# Patient Record
Sex: MALE | URBAN - NONMETROPOLITAN AREA
[De-identification: names, ages, dates, MRNs, and addresses within clinical notes are randomized per-mention and may not be internally consistent; named-entity substitution may affect disease eponyms.]

---

## 2018-01-23 ENCOUNTER — OFFICE VISIT CONVERTED (OUTPATIENT)
Dept: FAMILY MEDICINE CLINIC | Age: 73
End: 2018-01-23
Attending: FAMILY MEDICINE

## 2018-01-26 ENCOUNTER — OFFICE VISIT CONVERTED (OUTPATIENT)
Dept: FAMILY MEDICINE CLINIC | Age: 73
End: 2018-01-26
Attending: FAMILY MEDICINE

## 2018-04-04 ENCOUNTER — OFFICE VISIT CONVERTED (OUTPATIENT)
Dept: FAMILY MEDICINE CLINIC | Age: 73
End: 2018-04-04
Attending: NURSE PRACTITIONER

## 2018-04-17 ENCOUNTER — OFFICE VISIT CONVERTED (OUTPATIENT)
Dept: FAMILY MEDICINE CLINIC | Age: 73
End: 2018-04-17
Attending: FAMILY MEDICINE

## 2018-07-13 ENCOUNTER — OFFICE VISIT CONVERTED (OUTPATIENT)
Dept: FAMILY MEDICINE CLINIC | Age: 73
End: 2018-07-13
Attending: NURSE PRACTITIONER

## 2018-09-28 ENCOUNTER — OFFICE VISIT CONVERTED (OUTPATIENT)
Dept: FAMILY MEDICINE CLINIC | Age: 73
End: 2018-09-28
Attending: NURSE PRACTITIONER

## 2018-10-15 ENCOUNTER — OFFICE VISIT CONVERTED (OUTPATIENT)
Dept: FAMILY MEDICINE CLINIC | Age: 73
End: 2018-10-15
Attending: FAMILY MEDICINE

## 2018-10-17 ENCOUNTER — OFFICE VISIT CONVERTED (OUTPATIENT)
Dept: PODIATRY | Facility: CLINIC | Age: 73
End: 2018-10-17
Attending: PODIATRIST

## 2018-11-01 ENCOUNTER — OFFICE VISIT CONVERTED (OUTPATIENT)
Dept: PODIATRY | Facility: CLINIC | Age: 73
End: 2018-11-01
Attending: PODIATRIST

## 2018-11-01 ENCOUNTER — CONVERSION ENCOUNTER (OUTPATIENT)
Dept: OTHER | Facility: HOSPITAL | Age: 73
End: 2018-11-01

## 2018-11-29 ENCOUNTER — OFFICE VISIT CONVERTED (OUTPATIENT)
Dept: FAMILY MEDICINE CLINIC | Age: 73
End: 2018-11-29
Attending: FAMILY MEDICINE

## 2019-02-07 ENCOUNTER — OFFICE VISIT CONVERTED (OUTPATIENT)
Dept: PODIATRY | Facility: CLINIC | Age: 74
End: 2019-02-07
Attending: PODIATRIST

## 2019-02-07 ENCOUNTER — CONVERSION ENCOUNTER (OUTPATIENT)
Dept: PODIATRY | Facility: CLINIC | Age: 74
End: 2019-02-07

## 2019-02-28 ENCOUNTER — OFFICE VISIT CONVERTED (OUTPATIENT)
Dept: FAMILY MEDICINE CLINIC | Age: 74
End: 2019-02-28
Attending: FAMILY MEDICINE

## 2019-02-28 ENCOUNTER — HOSPITAL ENCOUNTER (OUTPATIENT)
Dept: OTHER | Facility: HOSPITAL | Age: 74
Discharge: HOME | End: 2019-02-28
Attending: FAMILY MEDICINE

## 2019-02-28 LAB
ALBUMIN SERPL-MCNC: 3.8 G/DL (ref 3.5–5)
ALBUMIN/GLOB SERPL: 1.3 {RATIO} (ref 1.4–2.6)
ALP SERPL-CCNC: 60 U/L (ref 56–155)
ALT SERPL-CCNC: 14 U/L (ref 10–40)
ANION GAP SERPL CALC-SCNC: 15 MMOL/L (ref 8–19)
AST SERPL-CCNC: 20 U/L (ref 15–50)
BILIRUB SERPL-MCNC: 0.81 MG/DL (ref 0.2–1.3)
BUN SERPL-MCNC: 19 MG/DL (ref 5–25)
BUN/CREAT SERPL: 17 {RATIO} (ref 6–20)
CALCIUM SERPL-MCNC: 9.5 MG/DL (ref 8.7–10.4)
CHLORIDE SERPL-SCNC: 106 MMOL/L (ref 99–111)
CHOLEST SERPL-MCNC: 129 MG/DL (ref 107–200)
CHOLEST/HDLC SERPL: 3.1 {RATIO} (ref 3–6)
CK SERPL-CCNC: 176 U/L (ref 57–374)
CONV CO2: 28 MMOL/L (ref 22–32)
CONV CREATININE URINE, RANDOM: 146.9 MG/DL (ref 10–300)
CONV MICROALBUM.,U,RANDOM: <12 MG/L (ref 0–20)
CONV TOTAL PROTEIN: 6.8 G/DL (ref 6.3–8.2)
CREAT UR-MCNC: 1.14 MG/DL (ref 0.7–1.2)
ERYTHROCYTE [DISTWIDTH] IN BLOOD BY AUTOMATED COUNT: 14.6 % (ref 11.5–14.5)
EST. AVERAGE GLUCOSE BLD GHB EST-MCNC: 151 MG/DL
GFR SERPLBLD BASED ON 1.73 SQ M-ARVRAT: >60 ML/MIN/{1.73_M2}
GLOBULIN UR ELPH-MCNC: 3 G/DL (ref 2–3.5)
GLUCOSE SERPL-MCNC: 110 MG/DL (ref 70–99)
HBA1C MFR BLD: 13.5 G/DL (ref 14–18)
HBA1C MFR BLD: 6.9 % (ref 3.5–5.7)
HCT VFR BLD AUTO: 43.1 % (ref 42–52)
HDLC SERPL-MCNC: 42 MG/DL (ref 40–60)
LDLC SERPL CALC-MCNC: 76 MG/DL (ref 70–100)
MCH RBC QN AUTO: 28.4 PG (ref 27–31)
MCHC RBC AUTO-ENTMCNC: 31.3 G/DL (ref 33–37)
MCV RBC AUTO: 90.7 FL (ref 80–96)
MICROALBUMIN/CREAT UR: 8.2 MG/G{CRE} (ref 0–25)
OSMOLALITY SERPL CALC.SUM OF ELEC: 301 MOSM/KG (ref 273–304)
PLATELET # BLD AUTO: 177 10*3/UL (ref 130–400)
PMV BLD AUTO: 9.5 FL (ref 7.4–10.4)
POTASSIUM SERPL-SCNC: 5 MMOL/L (ref 3.5–5.3)
RBC # BLD AUTO: 4.75 10*6/UL (ref 4.7–6.1)
SODIUM SERPL-SCNC: 144 MMOL/L (ref 135–147)
TRIGL SERPL-MCNC: 55 MG/DL (ref 40–150)
TSH SERPL-ACNC: 2.28 M[IU]/L (ref 0.27–4.2)
VLDLC SERPL-MCNC: 11 MG/DL (ref 5–37)
WBC # BLD AUTO: 5.72 10*3/UL (ref 4.8–10.8)

## 2019-05-11 ENCOUNTER — HOSPITAL ENCOUNTER (OUTPATIENT)
Dept: OTHER | Facility: HOSPITAL | Age: 74
Discharge: HOME | End: 2019-05-11
Attending: FAMILY MEDICINE

## 2019-05-11 LAB
APPEARANCE UR: CLEAR
BACTERIA UR QL AUTO: ABNORMAL
BILIRUB UR QL: NEGATIVE
CASTS URNS QL MICRO: ABNORMAL /[LPF]
COLOR UR: ABNORMAL
CONV LEUKOCYTE ESTERASE: NEGATIVE
CONV UROBILINOGEN IN URINE BY AUTOMATED TEST STRIP: 1 {EHRLICHU}/DL (ref 0.1–1)
EPI CELLS #/AREA URNS HPF: ABNORMAL /[HPF]
GLUCOSE 24H UR-MCNC: NEGATIVE MG/DL
HGB UR QL STRIP: NEGATIVE
KETONES UR QL STRIP: NEGATIVE MG/DL
MUCOUS THREADS URNS QL MICRO: ABNORMAL
NITRITE UR-MCNC: NEGATIVE MG/ML
PH UR STRIP.AUTO: 5.5 [PH] (ref 5–8)
PROT UR-MCNC: NEGATIVE MG/DL
RBC # BLD AUTO: ABNORMAL /[HPF]
SP GR UR STRIP: 1.01 (ref 1–1.03)
SPECIMEN SOURCE: ABNORMAL
UNIDENT CRYS URNS QL MICRO: ABNORMAL /[HPF]
WBC #/AREA URNS HPF: ABNORMAL /[HPF]

## 2019-05-28 ENCOUNTER — OFFICE VISIT CONVERTED (OUTPATIENT)
Dept: FAMILY MEDICINE CLINIC | Age: 74
End: 2019-05-28
Attending: FAMILY MEDICINE

## 2019-09-26 ENCOUNTER — HOSPITAL ENCOUNTER (OUTPATIENT)
Dept: OTHER | Facility: HOSPITAL | Age: 74
Discharge: HOME | End: 2019-09-26
Attending: FAMILY MEDICINE

## 2019-09-26 ENCOUNTER — OFFICE VISIT CONVERTED (OUTPATIENT)
Dept: FAMILY MEDICINE CLINIC | Age: 74
End: 2019-09-26
Attending: FAMILY MEDICINE

## 2019-09-26 LAB
ALBUMIN SERPL-MCNC: 4.3 G/DL (ref 3.5–5)
ALBUMIN/GLOB SERPL: 1.4 {RATIO} (ref 1.4–2.6)
ALP SERPL-CCNC: 83 U/L (ref 56–155)
ALT SERPL-CCNC: 14 U/L (ref 10–40)
ANION GAP SERPL CALC-SCNC: 18 MMOL/L (ref 8–19)
AST SERPL-CCNC: 21 U/L (ref 15–50)
BILIRUB SERPL-MCNC: 1.23 MG/DL (ref 0.2–1.3)
BUN SERPL-MCNC: 22 MG/DL (ref 5–25)
BUN/CREAT SERPL: 18 {RATIO} (ref 6–20)
CALCIUM SERPL-MCNC: 9.5 MG/DL (ref 8.7–10.4)
CHLORIDE SERPL-SCNC: 100 MMOL/L (ref 99–111)
CK SERPL-CCNC: 176 U/L (ref 57–374)
CONV CO2: 24 MMOL/L (ref 22–32)
CONV TOTAL PROTEIN: 7.3 G/DL (ref 6.3–8.2)
CREAT UR-MCNC: 1.21 MG/DL (ref 0.7–1.2)
ERYTHROCYTE [DISTWIDTH] IN BLOOD BY AUTOMATED COUNT: 16.5 % (ref 11.5–14.5)
EST. AVERAGE GLUCOSE BLD GHB EST-MCNC: 151 MG/DL
FERRITIN SERPL-MCNC: 54 NG/ML (ref 30–300)
FOLATE SERPL-MCNC: 18.3 NG/ML (ref 4.8–20)
GFR SERPLBLD BASED ON 1.73 SQ M-ARVRAT: 59 ML/MIN/{1.73_M2}
GLOBULIN UR ELPH-MCNC: 3 G/DL (ref 2–3.5)
GLUCOSE SERPL-MCNC: 81 MG/DL (ref 70–99)
HBA1C MFR BLD: 13.5 G/DL (ref 14–18)
HBA1C MFR BLD: 6.9 % (ref 3.5–5.7)
HCT VFR BLD AUTO: 43.4 % (ref 42–52)
IRON SERPL-MCNC: 63 UG/DL (ref 70–180)
MAGNESIUM SERPL-MCNC: 2.09 MG/DL (ref 1.6–2.3)
MCH RBC QN AUTO: 27.7 PG (ref 27–31)
MCHC RBC AUTO-ENTMCNC: 31.1 G/DL (ref 33–37)
MCV RBC AUTO: 88.9 FL (ref 80–96)
OSMOLALITY SERPL CALC.SUM OF ELEC: 286 MOSM/KG (ref 273–304)
PLATELET # BLD AUTO: 203 10*3/UL (ref 130–400)
PMV BLD AUTO: 9.6 FL (ref 7.4–10.4)
POTASSIUM SERPL-SCNC: 4.6 MMOL/L (ref 3.5–5.3)
RBC # BLD AUTO: 4.88 10*6/UL (ref 4.7–6.1)
SODIUM SERPL-SCNC: 137 MMOL/L (ref 135–147)
TSH SERPL-ACNC: 3.25 M[IU]/L (ref 0.27–4.2)
VIT B12 SERPL-MCNC: 581 PG/ML (ref 211–911)
WBC # BLD AUTO: 6.27 10*3/UL (ref 4.8–10.8)

## 2021-05-16 VITALS
HEIGHT: 74 IN | DIASTOLIC BLOOD PRESSURE: 59 MMHG | SYSTOLIC BLOOD PRESSURE: 117 MMHG | WEIGHT: 251 LBS | OXYGEN SATURATION: 95 % | HEART RATE: 74 BPM | BODY MASS INDEX: 32.21 KG/M2

## 2021-05-16 VITALS
HEART RATE: 65 BPM | BODY MASS INDEX: 28.6 KG/M2 | SYSTOLIC BLOOD PRESSURE: 117 MMHG | HEIGHT: 75 IN | DIASTOLIC BLOOD PRESSURE: 71 MMHG | OXYGEN SATURATION: 97 % | WEIGHT: 230 LBS

## 2021-05-16 VITALS
SYSTOLIC BLOOD PRESSURE: 129 MMHG | HEART RATE: 67 BPM | OXYGEN SATURATION: 94 % | WEIGHT: 252 LBS | DIASTOLIC BLOOD PRESSURE: 68 MMHG | HEIGHT: 74 IN | BODY MASS INDEX: 32.34 KG/M2

## 2021-05-18 NOTE — PROGRESS NOTES
Josh Williamluma TORRES 1945     Office/Outpatient Visit    Visit Date: Wed, Apr 4, 2018 08:27 am    Provider: Sarina Loco N.P. (Assistant: Violet Camejo MA)    Location: [facility]        Electronically signed by Sarina Loco N.P. on  04/04/2018 01:21:32 PM                             SUBJECTIVE:        CC:     Marcelino is a 72 year old White male.  Patient complains of left hip pain.          HPI:         Patient to be evaluated for hip pain.  The patient notes localized muscle pain.  This has been a problem for the past one to two days.  He describes the discomfort as moderate in severity.  Symptoms have been mild or transient punctuated by episodic flare-ups.  Primary joints affected include left side back/flank area.  Muscle groups affected include the latissimus dorsi.  Associated symptoms include and urine with stronger odor than usual/ darker.  He denies associated swollen joints, redness or fever.  The patient has tried nothing.  Pertinent medical history is remarkable for chronic back pain.      ROS:     CONSTITUTIONAL:  Negative for chills and fatigue.      CARDIOVASCULAR:  Negative for chest pain, orthopnea, paroxysmal nocturnal dyspnea and pedal edema.      RESPIRATORY:  Negative for dyspnea and cough.      GASTROINTESTINAL:  Negative for abdominal pain, heartburn, constipation, diarrhea, and stool changes.      GENITOURINARY:  Positive for urine with strong odor.      MUSCULOSKELETAL:  Positive for arthralgias and back pain.      NEUROLOGICAL:  Negative for paresthesias and weakness.          [facility]/FM/SH:     Last Reviewed on 4/04/2018 08:38 AM by Sarina Loco    Past Medical History:             PAST MEDICAL HISTORY         Hyperlipidemia: Hypercholesterolemia;     Type 2 Diabetes     gait left foot    Gout         PAST MEDICAL HISTORY             CURRENT MEDICAL PROVIDERS:    Cardiologist: Danika - Cardiothoracic Vascular surgeon    Graham - cardiologist         PREVENTIVE  HEALTH MAINTENANCE             COLORECTAL CANCER SCREENING: Up to date (colonoscopy q10y; sigmoidoscopy q5y; Cologuard q3y) was last done 04/2011, Results are in chart; colonoscopy with normal results; diverticulosis         Surgical History:         Positive for    Coronary Artery Bypass Graft: 3-V 12/2016 at age 71; ;     Positive for    Kidney Mass - Benign;;     Positive for    Cataract Removal,    AICD 3/2017; and    Left finger gangrene amputation;;         Family History:         Positive for Coronary Artery Disease ( father; mother ), Myocardial Infarction ( father; mother ) and Stroke-Father.      Positive for Prostate Cancer ( father ).      Positive for Type 2 Diabetes ( mother; sister ).          Tobacco/Alcohol/Supplements:     Last Reviewed on 4/04/2018 08:34 AM by Violet Camejo    Tobacco: He has a past history of cigarette smoking; quit date:  30 yrs ago.          Substance Abuse History:     Last Reviewed on 1/23/2018 03:24 PM by Krishna Medrano        Mental Health History:     Last Reviewed on 1/23/2018 03:24 PM by Krishna Medrano        Communicable Diseases (eg STDs):     Last Reviewed on 1/23/2018 03:24 PM by Krishna Medrano            Current Problems:     Last Reviewed on 4/04/2018 08:38 AM by Sarina Loco    Lower back pain     Acute gouty arthropathy     Congestive heart failure     Anemia of other chronic disease     CAD     Atrial fibrillation     Abdominal aortic aneurysm     Anxiety with depression     Type 2 diabetes (mild)     Mixed hyperlipidemia     HTN         Immunizations:     Td adult 11/2/2007     zzPrevnar-13 8/17/2015     Flulaval 9/20/2011     Fluzone (3 + years dose) 10/26/2006     Fluzone (3 + years dose) 11/2/2007     Fluzone (3 + years dose) 10/10/2008     Fluzone (3 + years dose) 9/23/2009     Fluzone (3 + years dose) 9/20/2010     Fluzone (3 + years dose) 10/19/2012     Fluzone pf (3+ years dose) 10/29/2013     Fluzone High-Dose pf (>=65  yr) 11/19/2014     Fluzone High-Dose pf (>=65 yr) 10/6/2015     Fluzone High-Dose pf (>=65 yr) 10/24/2016     Fluzone High-Dose pf (>=65 yr) 10/19/2017     PNEUMOVAX 23 (Pneumococcal PPV23) 10/18/2010         Allergies:     Last Reviewed on 4/04/2018 08:32 AM by Violet Camejo:    Lisinopril: cough (Adverse Reaction)        Current Medications:     Last Reviewed on 4/04/2018 08:38 AM by Sarina Loco    Metoprolol Tartrate 25mg Tablet Take 1 tablet(s) by mouth twice daily     Pantoprazole 40mg Tablets, Delayed Release Take 1 tablet(s) by mouth daily     Tamsulosin HCl 0.4mg Capsules 1 tab daily     Citalopram Hydrobromide 20mg Tablet Take 1 tablet(s) by mouth daily     Furosemide 40mg Tablets Take 1 tablet(s) by mouth daily     Potassium Chloride 10mEq Tablets, Extended Release     Amiodarone HCl 200mg Tablet 1 tab twice daily     Aspirin (ASA) 325mg Caplet 1 tab daily     Atorvastatin Calcium 80mg Tablet 1 tab daily     Eliquis 5mg Tablet Take 1 tablet(s) by mouth bid         OBJECTIVE:        Vitals:         Current: 4/4/2018 8:31:36 AM    Ht:  6 ft, 2 in;  Wt: 244 lbs;  BMI: 31.3    T: 96.9 F (oral);  BP: 112/69 mm Hg (left arm, sitting);  P: 79 bpm (left arm (BP Cuff), sitting);  sCr: 1.31 mg/dL;  GFR: 58.85        Exams:     PHYSICAL EXAM:     GENERAL: Vitals recorded well developed, well nourished;  well groomed;  no apparent distress;     NECK:  supple, full ROM; no thyromegaly; no carotid bruits;     RESPIRATORY: normal respiratory rate and pattern with no distress; normal breath sounds with no rales, rhonchi, wheezes or rubs;     CARDIOVASCULAR: normal rate; rhythm is regular;  normal S1; normal S2; no systolic murmur; no cyanosis; no edema;     MUSCULOSKELETAL: gait: uses a cane;  normal range of motion of all major muscle groups; pain with range of motion in: back extension and lateral flexion;     NEUROLOGICAL:  cranial nerves, motor and sensory function, reflexes, gait and coordination  are all intact;     PSYCHIATRIC:  appropriate affect and demeanor; normal speech pattern; grossly normal memory;         Lab/Test Results:             Glucose, Urine:  Neg (04/04/2018),     Bilirubin, urine:  Negative (04/04/2018),     Ketones, Urine Strip:  Negative (04/04/2018),     Specific Gravity, urine:  1.010 (04/04/2018),     Blood in Urine:  negative (04/04/2018),     pH, urine:  5.0 (04/04/2018),     Protein Urine QL:  negative (04/04/2018),     Urobilinogen, urine:  0.2 E.U./dL (04/04/2018),     Nitrite, Urine:  Negative (04/04/2018),     Leukoctyes, urine:  Negative (04/04/2018),     Appearance:  Clear (04/04/2018),     collection source:  Clean-catch (04/04/2018),     Color:  Yellow (04/04/2018),     Performed by::  tls (04/04/2018),             ASSESSMENT:           724.2   M54.5  Lower back pain              DDx:         ORDERS:         Meds Prescribed:       Robaxin (Methocarbamol) 500mg Tablet Take 1/2 to 1 tab at bedtime.  #10 (Ten) tablet(s) Refills: 0         Lab Orders:       [id number]  Urinalysis, automated, without microscopy  (In-House)                   PLAN:          Lower back pain     LABORATORY:  Labs ordered to be performed today include UA dip in office no micro.      RECOMMENDATIONS given include: apply heat, muscle relaxer at bedtime, educated on side effects - Tylenol.            Prescriptions:       Robaxin (Methocarbamol) 500mg Tablet Take 1/2 to 1 tab at bedtime.  #10 (Ten) tablet(s) Refills: 0           Orders:       [id number]  Urinalysis, automated, without microscopy  (In-House)               Patient Recommendations:        For  Lower back pain:     I also recommend apply heat, muscle relaxer at bedtime, educated on side effects - Tylenol.              CHARGE CAPTURE:           Primary Diagnosis:     724.2 Lower back pain            M54.5    Low back pain              Orders:          [id number]   Office/outpatient visit; established patient, level 3  (In-House)             [id number]    Urinalysis, automated, without microscopy  (In-House)

## 2021-05-18 NOTE — PROGRESS NOTES
Lory Moreno 1945     Office/Outpatient Visit    Visit Date: Mon, Oct 15, 2018 11:46 am    Provider: Krishna Medrano MD (Assistant: Lina Santacruz LPN)    Location: [facility]        Electronically signed by Krishna Medrano MD on  10/18/2018 12:31:55 PM                             SUBJECTIVE:        CC: 'he fell on Thursday'         HPI:     Marcelino is in today for followup.  He has taken a couple of recent falls.  He apparently fell last week and then again over the weekend.  It does not sound like he had any syncopal episode.  Rather, he seems to have lost his balance and fallen.  He did hit his right flank on one of the falls and has developed a pretty significant hematoma there.  They want to get that looked at.  Marcelino does have a history of atrial fibrillation and is on beta blockade, as well as Eliquis.  He also apparently injured his left great toe with one of the falls.  There was a question of a toe fracture when he was in the emergency department at [facility] last week.  He also apparently was seen at the [facility] emergency room, although I don't have records from them yet.  He does have a very significant blister that appears to be blood-filled on the left great toe, proximal to the nail.  It is about 4 cm in its longest dimension.  He denies significant pain at this time, but he and his wife are both concerned about the significance of the bruising and also the blister on the toe.       ROS:     CONSTITUTIONAL:  Negative for chills and fever.      CARDIOVASCULAR:  Negative for chest pain and palpitations.      RESPIRATORY:  Negative for recent cough and dyspnea.      GASTROINTESTINAL:  Negative for abdominal pain, nausea and vomiting.      INTEGUMENTARY:  Negative for atypical mole(s) and rash.          PMH/FMH/SH:     Last Reviewed on 10/15/2018 12:05 PM by Krishna Medrano    Past Medical History:             PAST MEDICAL HISTORY         Hyperlipidemia:  Hypercholesterolemia;     Type 2 Diabetes     gait left foot    Gout         PAST MEDICAL HISTORY             CURRENT MEDICAL PROVIDERS:    Cardiologist: Danika - Cardiothoracic Vascular surgeon    Graham - cardiologist         PREVENTIVE HEALTH MAINTENANCE             COLORECTAL CANCER SCREENING: Up to date (colonoscopy q10y; sigmoidoscopy q5y; Cologuard q3y) was last done 04/2011, Results are in chart; colonoscopy with normal results; diverticulosis     EYE EXAM: Diabetic Eye Exam during this calendar year and results are in chart was last done 08/2017         Surgical History:         Positive for    Coronary Artery Bypass Graft: 3-V 12/2016 at age 71; ;     Positive for    Kidney Mass - Benign;;     Positive for    Cataract Removal,    AICD 3/2017; and    Left finger gangrene amputation;;         Family History:         Positive for Coronary Artery Disease ( father; mother ), Myocardial Infarction ( father; mother ) and Stroke-Father.      Positive for Prostate Cancer ( father ).      Positive for Type 2 Diabetes ( mother; sister ).          Tobacco/Alcohol/Supplements:     Last Reviewed on 10/15/2018 12:05 PM by Krishna Medrano    Tobacco: He has a past history of cigarette smoking; quit date:  30 yrs ago.          Substance Abuse History:     Last Reviewed on 10/15/2018 12:05 PM by Krishna Medrano        Mental Health History:     Last Reviewed on 10/15/2018 12:05 PM by Krishna Medrano        Communicable Diseases (eg STDs):     Last Reviewed on 10/15/2018 12:05 PM by Krishna Medrano            Current Problems:     Last Reviewed on 10/15/2018 12:05 PM by Krishna Medrano    Low back pain     Lower back pain     Acute gouty arthropathy     Congestive heart failure     Anemia of other chronic disease     CAD     Atrial fibrillation     Abdominal aortic aneurysm     Anxiety with depression     Type 2 diabetes (mild)     Mixed hyperlipidemia     HTN     Blister on foot and  toe(s)     Hematoma of back     Cerumen impaction         Immunizations:     Td adult 11/2/2007     zzPrevnar-13 8/17/2015     Flulaval 9/20/2011     Fluzone (3 + years dose) 10/26/2006     Fluzone (3 + years dose) 11/2/2007     Fluzone (3 + years dose) 10/10/2008     Fluzone (3 + years dose) 9/23/2009     Fluzone (3 + years dose) 9/20/2010     Fluzone (3 + years dose) 10/19/2012     Fluzone pf (3+ years dose) 10/29/2013     Fluzone High-Dose pf (>=65 yr) 11/19/2014     Fluzone High-Dose pf (>=65 yr) 10/6/2015     Fluzone High-Dose pf (>=65 yr) 10/24/2016     Fluzone High-Dose pf (>=65 yr) 10/19/2017     PNEUMOVAX 23 (Pneumococcal PPV23) 10/18/2010         Allergies:     Last Reviewed on 10/15/2018 12:05 PM by Krishna Medranoco:    Lisinopril: cough (Adverse Reaction)        Current Medications:     Last Reviewed on 10/15/2018 12:05 PM by Krishna Medrano    Citalopram Hydrobromide 20mg Tablet Take 1 tablet(s) by mouth daily     Furosemide 40mg Tablets Take 1 tablet(s) by mouth daily     Metoprolol Tartrate 25mg Tablet Take 1 tablet(s) by mouth twice daily     Pantoprazole 40mg Tablets, Delayed Release Take 1 tablet(s) by mouth daily     Tamsulosin HCl 0.4mg Capsules 1 tab daily     Potassium Chloride 10mEq Tablets, Extended Release     Amiodarone HCl 200mg Tablet 1 tab twice daily     Aspirin (ASA) 325mg Caplet 1 tab daily     Atorvastatin Calcium 80mg Tablet 1 tab daily     Eliquis 5mg Tablet Take 1 tablet(s) by mouth bid         OBJECTIVE:        Vitals:         Current: 10/15/2018 11:51:01 AM    Ht:  6 ft, 2 in;  Wt: 256.8 lbs;  BMI: 33.0    T: 97.9 F (oral);  BP: 112/45 mm Hg (left arm, sitting);  P: 66 bpm (left arm (BP Cuff), sitting);  sCr: 1.31 mg/dL;  GFR: 59.29        Exams:     PHYSICAL EXAM:     GENERAL: Vitals recorded well developed, well nourished;     NECK: range of motion is normal; thyroid is non-palpable;     RESPIRATORY: normal respiratory rate and pattern with no distress;  normal breath sounds with no rales, rhonchi, wheezes or rubs;     CARDIOVASCULAR: normal rate; rhythm is regular;  no systolic murmur;     GASTROINTESTINAL: nontender; normal bowel sounds; no masses;     SKIN: there is large blood blister on the L great toe - some redness is noted around the toe proper - no clear evidence of infection;     MUSCULOSKELETAL: there is very significant hematoma of the R flank - no obvious mass otherwise;         Procedures:     Vaccination against other viral diseases, Influenza     1. Influenza high dose 0.5 ml unit dose, ABN signed given IM in the right upper arm; administered by  Regarding contraindications to an Influenza vaccine: Denies moderate/severe illness with/without fever; serious reaction to eggs, egg proteins, gentamicin, gelatin, arginine, neomycin or polymixin; serious reaction after recieving previous influenza vaccines; and history of Guillain-New Century Syndrome.              ASSESSMENT           922.31   S30.1XXA  Hematoma of back              DDx:     427.31   I48.0  Atrial fibrillation              DDx:     917.2   S90.421A  Blister on foot and toe(s)              DDx:     826.0   S92.424A  Closed fracture of toe              DDx:     V04.81   Z23  Vaccination against other viral diseases, Influenza              DDx:         ORDERS:         Procedures Ordered:       REFER  Referral to Specialist or Other Facility  (Send-Out)         95277  Fluzone High Dose  (In-House)           Other Orders:         Administration of influenza virus vaccine (x1)                 PLAN:          Hematoma of back     1)I am very concerned about the recent falls.  I have strongly cautioned Mr. Moreno in regard to this and ask that he be careful not to fall again.  He has been fortunate to not have sustained an obviously life-threatening injury from the fall.     2) I would expect the hematoma of the back to gradually resolve.      3)I am concerned about the question of the toe  fracture, as well as the significant blister on the left great toe.  I am going to refer him to podiatry for evaluation.  I have never seen a blister of this size on a toe, and I would prefer they be involved in managing it.  Hopefully, he won't develop a secondary infection from it.      4)We will follow closely.  No other change for today.               Patient Education Handouts:       Contusion           Atrial fibrillation As above.          Blister on foot and toe(s)         REFERRALS:  Referral initiated to Podiatry.            Orders:       REFER  Referral to Specialist or Other Facility  (Send-Out)            Closed fracture of toe As above.          Vaccination against other viral diseases, Influenza           Orders:       13542  Fluzone High Dose  (In-House)                     Administration of influenza virus vaccine (x1)             Patient Recommendations:        For  Blister on foot and toe(s):     I also recommend Podiatry.              CHARGE CAPTURE           **Please note: ICD descriptions below are intended for billing purposes only and may not represent clinical diagnoses**        Primary Diagnosis:         922.31 Hematoma of back            S30.1XXA    Contusion of abdominal wall, initial encounter              Orders:          [id number]   Office/outpatient visit; established patient, level 4  (In-House)           427.31 Atrial fibrillation            I48.0    Paroxysmal atrial fibrillation    917.2 Blister on foot and toe(s)            S90.421A    Blister (nonthermal), right great toe, initial encounter    826.0 Closed fracture of toe            S92.424A    Nondisplaced fracture of distal phalanx of right great toe, initial encounter for closed fracture    V04.81 Vaccination against other viral diseases, Influenza            Z23    Encounter for immunization              Orders:          24962   Fluzone High Dose  (In-House)                                           Administration of  influenza virus vaccine (x1)

## 2021-05-18 NOTE — PROGRESS NOTES
Josh Williamluma TORRES 1945     Office/Outpatient Visit    Visit Date: Tue, Apr 17, 2018 11:28 am    Provider: Krishna Medrano MD (Assistant: Violet Camejo MA)    Location: [facility]        Electronically signed by Krishna Medrano MD on  04/17/2018 12:20:55 PM                             SUBJECTIVE:        CC: cholesterol, a fib, GERD         HPI:         Patient presents with type 2 diabetes (mild).  Current meds include nothing.  He reports home blood glucose readings He checks his glucose 2 times per week.  Most recent lab results include Hemoglobin A1c:  6.5 (%) (04/19/2017),  6.6 (%) (01/04/2018), LDL:  87 (mg/dL) (09/13/2017).          With regard to the atrial fibrillation, he is here for routine follow-up for atrial fibrillation. Current related medications include amiodarone and Eliquis.   He is extremely compliant with his medication regimen.          Additionally, he presents with history of mixed hyperlipidemia.  current treatment includes Lipitor.  Compliance with treatment has been good; he takes his medication as directed and follows up as directed.      ROS:     CONSTITUTIONAL:  Negative for chills and fever.      CARDIOVASCULAR:  Negative for chest pain and palpitations.      RESPIRATORY:  Negative for recent cough and dyspnea.      GASTROINTESTINAL:  Negative for abdominal pain, nausea and vomiting.          [facility]/:     Last Reviewed on 4/17/2018 11:53 AM by Krishna Medrano    Past Medical History:             PAST MEDICAL HISTORY         Hyperlipidemia: Hypercholesterolemia;     Type 2 Diabetes     gait left foot    Gout         PAST MEDICAL HISTORY             CURRENT MEDICAL PROVIDERS:    Cardiologist: Danika - Cardiothoracic Vascular surgeon    Graham - cardiologist         PREVENTIVE HEALTH MAINTENANCE             COLORECTAL CANCER SCREENING: Up to date (colonoscopy q10y; sigmoidoscopy q5y; Cologuard q3y) was last done 04/2011, Results are in chart; colonoscopy with  normal results; diverticulosis         Surgical History:         Positive for    Coronary Artery Bypass Graft: 3-V 12/2016 at age 71; ;     Positive for    Kidney Mass - Benign;;     Positive for    Cataract Removal,    AICD 3/2017; and    Left finger gangrene amputation;;         Family History:         Positive for Coronary Artery Disease ( father; mother ), Myocardial Infarction ( father; mother ) and Stroke-Father.      Positive for Prostate Cancer ( father ).      Positive for Type 2 Diabetes ( mother; sister ).          Tobacco/Alcohol/Supplements:     Last Reviewed on 4/17/2018 11:53 AM by Krishna Medrano    Tobacco: He has a past history of cigarette smoking; quit date:  30 yrs ago.          Substance Abuse History:     Last Reviewed on 4/17/2018 11:53 AM by Krishna Medrano        Mental Health History:     Last Reviewed on 4/17/2018 11:53 AM by Krishna Medrano        Communicable Diseases (eg STDs):     Last Reviewed on 4/17/2018 11:53 AM by Krishna Medrano            Current Problems:     Last Reviewed on 4/17/2018 11:53 AM by Krishna Medrano    Lower back pain     Acute gouty arthropathy     Congestive heart failure     Anemia of other chronic disease     CAD     Atrial fibrillation     Abdominal aortic aneurysm     Anxiety with depression     Type 2 diabetes (mild)     Mixed hyperlipidemia     HTN         Immunizations:     Td adult 11/2/2007     zzPrevnar-13 8/17/2015     Flulaval 9/20/2011     Fluzone (3 + years dose) 10/26/2006     Fluzone (3 + years dose) 11/2/2007     Fluzone (3 + years dose) 10/10/2008     Fluzone (3 + years dose) 9/23/2009     Fluzone (3 + years dose) 9/20/2010     Fluzone (3 + years dose) 10/19/2012     Fluzone pf (3+ years dose) 10/29/2013     Fluzone High-Dose pf (>=65 yr) 11/19/2014     Fluzone High-Dose pf (>=65 yr) 10/6/2015     Fluzone High-Dose pf (>=65 yr) 10/24/2016     Fluzone High-Dose pf (>=65 yr) 10/19/2017     PNEUMOVAX 23  (Pneumococcal PPV23) 10/18/2010         Allergies:     Last Reviewed on 4/17/2018 11:53 AM by Krishna Medrano    Norco:    Lisinopril: cough (Adverse Reaction)        Current Medications:     Last Reviewed on 4/17/2018 11:53 AM by Krishna Medrano    Pantoprazole 40mg Tablets, Delayed Release Take 1 tablet(s) by mouth daily     Tamsulosin HCl 0.4mg Capsules 1 tab daily     Metoprolol Tartrate 25mg Tablet Take 1 tablet(s) by mouth twice daily     Citalopram Hydrobromide 20mg Tablet Take 1 tablet(s) by mouth daily     Furosemide 40mg Tablets Take 1 tablet(s) by mouth daily     Potassium Chloride 10mEq Tablets, Extended Release     Amiodarone HCl 200mg Tablet 1 tab twice daily     Aspirin (ASA) 325mg Caplet 1 tab daily     Atorvastatin Calcium 80mg Tablet 1 tab daily     Eliquis 5mg Tablet Take 1 tablet(s) by mouth bid     Robaxin 500mg Tablets Take 1/2 to 1 tab at bedtime.         OBJECTIVE:        Vitals:         Current: 4/17/2018 11:32:30 AM    Ht:  6 ft, 2 in;  Wt: 245.8 lbs;  BMI: 31.6    T: 96.8 F (oral);  BP: 109/74 mm Hg (left arm, sitting);  P: 67 bpm (left arm (BP Cuff), sitting);  sCr: 1.31 mg/dL;  GFR: 59.03        Exams:     PHYSICAL EXAM:     GENERAL: Vitals recorded well developed, well nourished;     NECK: range of motion is normal; thyroid is non-palpable;     RESPIRATORY: normal respiratory rate and pattern with no distress; normal breath sounds with no rales, rhonchi, wheezes or rubs;     CARDIOVASCULAR: normal rate; rhythm is regular;  no systolic murmur;     GASTROINTESTINAL: nontender; normal bowel sounds; no masses;     SKIN:  no significant rashes or lesions; no suspicious moles;     MUSCULOSKELETAL: there is diminished ROM in the L lower back - some tenderness is noted;     NEUROLOGIC: mental status: alert and oriented x 3; cranial nerves II-XII grossly intact;         ASSESSMENT           250.00   E11.9  Type 2 diabetes (mild)              DDx:     427.31   I48.0  Atrial  fibrillation              DDx:     272.2   E78.2  Mixed hyperlipidemia              DDx:     724.2   M54.5  Low back pain              DDx:         ORDERS:         Meds Prescribed:       Refill of: Pantoprazole 40mg Tablets, Delayed Release Take 1 tablet(s) by mouth daily  #90 (Ninety) tablet(s) Refills: 1       Refill of: Tamsulosin HCl 0.4mg Capsules 1 tab daily  #90 (Ninety) capsule(s) Refills: 1       Refill of: Metoprolol Tartrate 25mg Tablet Take 1 tablet(s) by mouth twice daily  #180 (One Devol and Eighty) tablet(s) Refills: 1       Refill of: Citalopram Hydrobromide 20mg Tablet Take 1 tablet(s) by mouth daily  #90 (Ninety) tablet(s) Refills: 1       Refill of: Furosemide 40mg Tablets Take 1 tablet(s) by mouth daily  #90 (Ninety) tablet(s) Refills: 1         Radiology/Test Orders:       [id number]  Radiologic examination, spine, lumbosacral;  minimum of four views  (Send-Out)                   PLAN:          Type 2 diabetes (mild)         RECOMMENDATIONS given include: Today, we have reviewed Duck's care.  I'm going to refill his medications and X-ray the lower back as noted.  We will make referral to physical therapy though for his back and his balance..            Patient Education Handouts:       [facility] Medication Compliance           Atrial fibrillation As above.           Prescriptions:       Refill of: Metoprolol Tartrate 25mg Tablet Take 1 tablet(s) by mouth twice daily  #180 (One Devol and Eighty) tablet(s) Refills: 1          Mixed hyperlipidemia As above.          Low back pain         RADIOLOGY:  I have ordered Lumbar/Sacral Spine X-ray to be done today.            Orders:       [id number]  Radiologic examination, spine, lumbosacral;  minimum of four views  (Send-Out)               Other Prescriptions:       Refill of: Pantoprazole 40mg Tablets, Delayed Release Take 1 tablet(s) by mouth daily  #90 (Ninety) tablet(s) Refills: 1       Refill of: Tamsulosin HCl 0.4mg Capsules 1 tab daily  #90 (Ninety)  capsule(s) Refills: 1       Refill of: Citalopram Hydrobromide 20mg Tablet Take 1 tablet(s) by mouth daily  #90 (Ninety) tablet(s) Refills: 1       Refill of: Furosemide 40mg Tablets Take 1 tablet(s) by mouth daily  #90 (Ninety) tablet(s) Refills: 1         CHARGE CAPTURE           **Please note: ICD descriptions below are intended for billing purposes only and may not represent clinical diagnoses**        Primary Diagnosis:         250.00 Type 2 diabetes (mild)            E11.9    Type 2 diabetes mellitus without complications              Orders:          [id number]   Office/outpatient visit; established patient, level 4  (In-House)           427.31 Atrial fibrillation            I48.0    Paroxysmal atrial fibrillation    272.2 Mixed hyperlipidemia            E78.2    Mixed hyperlipidemia    724.2 Low back pain            M54.5    Low back pain

## 2021-05-18 NOTE — PROGRESS NOTES
Lory Moreno 1945     Office/Outpatient Visit    Visit Date: Fri, Jan 26, 2018 02:10 pm    Provider: Krishna Medrano MD (Assistant: Andria Keane MA)    Location: [facility]        Electronically signed by Krishna Medrano MD on  01/26/2018 05:40:47 PM                             SUBJECTIVE:        CC: gout         HPI:     Marcelino is in today for evaluation of what may be gout.  He says that this developed in the last 24 hours.  He just woke up yesterday and noted it.  He has been using some ice for this.  He has had gout before, but he has not previously had it in both feet.  He has been taking an increased dose of furosemide though recently that might have flared things up.     ROS:     CONSTITUTIONAL:  Negative for chills and fever.      CARDIOVASCULAR:  Negative for chest pain and palpitations.      RESPIRATORY:  Negative for recent cough and dyspnea.      GASTROINTESTINAL:  Negative for abdominal pain, nausea and vomiting.          PM/[facility]/:     Last Reviewed on 1/23/2018 03:24 PM by Krishna Medrano    Past Medical History:             PAST MEDICAL HISTORY         Hyperlipidemia: Hypercholesterolemia;     Type 2 Diabetes     gait left foot    Gout         PAST MEDICAL HISTORY             CURRENT MEDICAL PROVIDERS:    Cardiologist: Danika - Cardiothoracic Vascular surgeon    Graham - cardiologist         PREVENTIVE HEALTH MAINTENANCE             COLORECTAL CANCER SCREENING: Up to date (colonoscopy q10y; sigmoidoscopy q5y; Cologuard q3y) was last done 04/2011, Results are in chart; colonoscopy with normal results; diverticulosis         Surgical History:         Positive for    Coronary Artery Bypass Graft: 3-V 12/2016 at age 71; ;     Positive for    Kidney Mass - Benign;;     Positive for    Cataract Removal,    AICD 3/2017; and    Left finger gangrene amputation;;         Family History:         Positive for Coronary Artery Disease ( father; mother ), Myocardial Infarction (  father; mother ) and Stroke-Father.      Positive for Prostate Cancer ( father ).      Positive for Type 2 Diabetes ( mother; sister ).          Tobacco/Alcohol/Supplements:     Last Reviewed on 1/23/2018 03:24 PM by Krishna Medrano    Tobacco: He has a past history of cigarette smoking; quit date:  30 yrs ago.          Substance Abuse History:     Last Reviewed on 1/23/2018 03:24 PM by Krishna Medrano        Mental Health History:     Last Reviewed on 1/23/2018 03:24 PM by Krishna Medrano        Communicable Diseases (eg STDs):     Last Reviewed on 1/23/2018 03:24 PM by Krishna Medrano            Current Problems:     Last Reviewed on 1/23/2018 03:24 PM by Krishna Medrano    Congestive heart failure     Anemia of other chronic disease     CAD     Atrial fibrillation     Abdominal aortic aneurysm     Anxiety with depression     Type 2 diabetes (mild)     Mixed hyperlipidemia     HTN     Cough         Immunizations:     Td adult 11/2/2007     Prevnar-13 8/17/2015     Flulaval 9/20/2011     Fluzone (3 + years dose) 10/26/2006     Fluzone (3 + years dose) 11/2/2007     Fluzone (3 + years dose) 10/10/2008     Fluzone (3 + years dose) 9/23/2009     Fluzone (3 + years dose) 9/20/2010     Fluzone (3 + years dose) 10/19/2012     Fluzone pf (3+ years dose) 10/29/2013     Fluzone High-Dose pf (>=65 yr) 11/19/2014     Fluzone High-Dose pf (>=65 yr) 10/6/2015     Fluzone High-Dose pf (>=65 yr) 10/24/2016     Fluzone High-Dose pf (>=65 yr) 10/19/2017     PNEUMOVAX 23 (Pneumococcal PPV23) 10/18/2010         Allergies:     Last Reviewed on 1/23/2018 03:24 PM by Krishna Medrano    Norco:    Lisinopril: cough (Adverse Reaction)        Current Medications:     Last Reviewed on 1/23/2018 03:24 PM by Krishna Medrano    Metoprolol Tartrate 25mg Tablet Take 1 tablet(s) by mouth twice daily     Pantoprazole 40mg Tablets, Delayed Release Take 1 tablet(s) by mouth daily     Tamsulosin HCl  0.4mg Capsules 1 tab daily     Citalopram Hydrobromide 20mg Tablet Take 1 tablet(s) by mouth daily     Furosemide 40mg Tablets Take 1 tablet(s) by mouth daily     Potassium Chloride 10mEq Tablets, Extended Release     Amiodarone HCl 200mg Tablet 1 tab twice daily     Aspirin (ASA) 325mg Caplet 1 tab daily     Atorvastatin Calcium 80mg Tablet 1 tab daily     Eliquis 5mg Tablet Take 1 tablet(s) by mouth bid     Medrol 4mg Dosepak Take as directed with food     TRUE Metrix Air Meter  Kit check BS QD DX E11.9     TRUE Metrix Test Strips  Reagent Strips Check blood sugar once daily. DX E11.9     TRUEplus Single-Use Lancets 33G  Lancet test blood sugar daily E 11.9     Prednisone 20mg Tablet Take  2 tablet(s) by mouth qd         OBJECTIVE:        Vitals:         Current: 1/26/2018 2:18:35 PM    Ht:  6 ft, 2 in;  Wt: 242 lbs;  BMI: 31.1    T: 97.1 F (oral);  BP: 117/64 mm Hg (right arm, sitting);  P: 70 bpm (right arm (BP Cuff), sitting);  sCr: 1.31 mg/dL;  GFR: 58.65        Exams:     PHYSICAL EXAM:     GENERAL: Vitals recorded well developed, well nourished;     NECK: range of motion is normal; thyroid is non-palpable;     RESPIRATORY: normal respiratory rate and pattern with no distress; normal breath sounds with no rales, rhonchi, wheezes or rubs;     CARDIOVASCULAR: normal rate; rhythm is regular;  no systolic murmur;     GASTROINTESTINAL: nontender; normal bowel sounds; no masses;     SKIN:  no significant rashes or lesions; no suspicious moles;     MUSCULOSKELETAL: there is significant redness and edema of the R first MTP joint - there is some mild redness of the L lateral foot as well;         ASSESSMENT           274.01   M10.00  Acute gouty arthropathy              DDx:         PLAN:          Acute gouty arthropathy         RECOMMENDATIONS given include: This looks like gout.  It has not flared up for many years, but he is on increased doses of diuretic therapy.  For the near term, he will take the steroid as  prescribed.  They will let me know if this does not calm down as expected.  No other change today..            Patient Education Handouts:       Acute Gout              CHARGE CAPTURE           **Please note: ICD descriptions below are intended for billing purposes only and may not represent clinical diagnoses**        Primary Diagnosis:         274.01 Acute gouty arthropathy            M10.00    Idiopathic gout, unspecified site              Orders:          [id number]   Office/outpatient visit; established patient, level 3  (In-House)               ADDENDUMS:      ____________________________________    Addendum: 02/12/2018 02:05 PM - Michelle Noyola         Visit Note Faxed to:        Bertha Mccloud  (Neurology); Number [phone]     Health Summary Faxed to:        Bertha Mccloud  (Neurology); Number [phone]

## 2021-05-18 NOTE — PROGRESS NOTES
Lory Moreno 1945     Office/Outpatient Visit    Visit Date: Fri, Sep 28, 2018 02:12 pm    Provider: Ann Marie Steven N.P. (Assistant: Violet Camejo MA)    Location: [facility]        Electronically signed by Ann Marie Steven N.P. on  10/01/2018 08:35:38 AM                             SUBJECTIVE:        CC:     Marcelino is a 72 year old White male.  Patient is here to have his ears cleaned out.          HPI:         Cerumen impaction: Pt states feeling his ears are clogged. This has happened in the past with irrigation fixing it. Pt states noticing hearing changes.      ROS:     CONSTITUTIONAL:  Negative for chills, fatigue, fever and weight change.      CARDIOVASCULAR:  Negative for chest pain, orthopnea, paroxysmal nocturnal dyspnea and pedal edema.      RESPIRATORY:  Negative for dyspnea and cough.      GASTROINTESTINAL:  Negative for abdominal pain, heartburn, constipation, diarrhea, and stool changes.      PSYCHIATRIC:  Negative for anxiety and depression.          PM[facility]/:     Last Reviewed on 10/01/2018 08:30 AM by Ann Marie Steven    Past Medical History:             PAST MEDICAL HISTORY         Hyperlipidemia: Hypercholesterolemia;     Type 2 Diabetes     gait left foot    Gout         PAST MEDICAL HISTORY             CURRENT MEDICAL PROVIDERS:    Cardiologist: Danika - Cardiothoracic Vascular surgeon    Graham - cardiologist         PREVENTIVE HEALTH MAINTENANCE             COLORECTAL CANCER SCREENING: Up to date (colonoscopy q10y; sigmoidoscopy q5y; Cologuard q3y) was last done 04/2011, Results are in chart; colonoscopy with normal results; diverticulosis     EYE EXAM: Diabetic Eye Exam during this calendar year and results are in chart was last done 08/2017         Surgical History:         Positive for    Coronary Artery Bypass Graft: 3-V 12/2016 at age 71; ;     Positive for    Kidney Mass - Benign;;     Positive for    Cataract Removal,    AICD 3/2017; and    Left finger  gangrene amputation;;         Family History:         Positive for Coronary Artery Disease ( father; mother ), Myocardial Infarction ( father; mother ) and Stroke-Father.      Positive for Prostate Cancer ( father ).      Positive for Type 2 Diabetes ( mother; sister ).          Tobacco/Alcohol/Supplements:     Last Reviewed on 10/01/2018 08:30 AM by Ann Marie Steven    Tobacco: He has a past history of cigarette smoking; quit date:  30 yrs ago.          Substance Abuse History:     Last Reviewed on 10/01/2018 08:30 AM by Ann Marie Steven        Mental Health History:     Last Reviewed on 10/01/2018 08:30 AM by Ann Marie Steven        Communicable Diseases (eg STDs):     Last Reviewed on 10/01/2018 08:30 AM by Ann Marie Steven            Current Problems:     Last Reviewed on 10/01/2018 08:30 AM by Ann Marie Steven    Low back pain     Lower back pain     Acute gouty arthropathy     Congestive heart failure     Anemia of other chronic disease     CAD     Atrial fibrillation     Abdominal aortic aneurysm     Anxiety with depression     Type 2 diabetes (mild)     Mixed hyperlipidemia     HTN     Cerumen impaction         Immunizations:     Td adult 11/2/2007     zzPrevnar-13 8/17/2015     Flulaval 9/20/2011     Fluzone (3 + years dose) 10/26/2006     Fluzone (3 + years dose) 11/2/2007     Fluzone (3 + years dose) 10/10/2008     Fluzone (3 + years dose) 9/23/2009     Fluzone (3 + years dose) 9/20/2010     Fluzone (3 + years dose) 10/19/2012     Fluzone pf (3+ years dose) 10/29/2013     Fluzone High-Dose pf (>=65 yr) 11/19/2014     Fluzone High-Dose pf (>=65 yr) 10/6/2015     Fluzone High-Dose pf (>=65 yr) 10/24/2016     Fluzone High-Dose pf (>=65 yr) 10/19/2017     PNEUMOVAX 23 (Pneumococcal PPV23) 10/18/2010         Allergies:     Last Reviewed on 10/01/2018 08:30 AM by Ann Marie Steven    Norco:    Lisinopril: cough (Adverse Reaction)        Current Medications:     Last Reviewed on 10/01/2018 08:30 AM by  Maurizio, Ann Marie    Citalopram Hydrobromide 20mg Tablet Take 1 tablet(s) by mouth daily     Furosemide 40mg Tablets Take 1 tablet(s) by mouth daily     Metoprolol Tartrate 25mg Tablet Take 1 tablet(s) by mouth twice daily     Pantoprazole 40mg Tablets, Delayed Release Take 1 tablet(s) by mouth daily     Tamsulosin HCl 0.4mg Capsules 1 tab daily     Potassium Chloride 10mEq Tablets, Extended Release     Amiodarone HCl 200mg Tablet 1 tab twice daily     Aspirin (ASA) 325mg Caplet 1 tab daily     Atorvastatin Calcium 80mg Tablet 1 tab daily     Eliquis 5mg Tablet Take 1 tablet(s) by mouth bid         OBJECTIVE:        Vitals:         Current: 9/28/2018 2:19:41 PM    Ht:  6 ft, 2 in;  Wt: 250.2 lbs;  BMI: 32.1    T: 97.4 F (oral);  BP: 109/58 mm Hg (left arm, sitting);  P: 75 bpm (left arm (BP Cuff), sitting);  sCr: 1.31 mg/dL;  GFR: 59.48        Exams:     PHYSICAL EXAM:     GENERAL: Vitals recorded well developed, well nourished;  well groomed;  no apparent distress;     EYES: lids and lacrimal system are normal in appearance; extraocular movements intact; conjunctiva and cornea are normal; PERRLA;     E/N/T: EARS: external auditory canal occluded by cerumen bilaterally;  Bilateral ear irrigation started by provider, finished by MA.; NOSE:  normal nasal mucosa, septum, turbinates, and sinuses; OROPHARYNX:  normal mucosa, dentition, gingiva, and posterior pharynx; EAC's clear after irrigation;     NECK:  supple, full ROM; no thyromegaly; no carotid bruits;     RESPIRATORY: normal respiratory rate and pattern with no distress; normal breath sounds with no rales, rhonchi, wheezes or rubs;     CARDIOVASCULAR: normal rate; rhythm is regular;  normal S1; normal S2; no systolic murmur; no cyanosis; no edema;         Procedures:     Cerumen impaction     Cerumen impaction is noted in both ears The degree of wax accumulation is minor in the left ear and right ear.  With minimal difficulty, using a syringe irrigation, the wax  is removed.  Removed from ear was hard balls of wax.  The patient tolerated the procedure well.      There were no complications.  Performed by:pr             ASSESSMENT:           380.4   H61.23  Cerumen impaction              DDx:         ORDERS:         Lab Orders:       APPTO  Appointment need  (In-House)           Procedures Ordered:       [id number]  Removal impacted cerumen, one or both ears  (In-House)                   PLAN:          Cerumen impaction         TESTS/PROCEDURES:  Will proceed with Cerumen Removal--by Provider: Both ears, to be performed/scheduled now.      FOLLOW-UP: Advised to call if there is no improvement..   for [place] [facility] visit follow up           Orders:       [id number]  Removal impacted cerumen, one or both ears  (In-House)         APPTO  Appointment need  (In-House)             Patient Education Handouts:       Earwax Blockage (Cerumen Impaction)              Patient Recommendations:        For  Cerumen impaction:                     APPOINTMENT INFORMATION:        Monday Tuesday Wednesday Thursday Friday Saturday Sunday            Time:___________________AM  PM   Date:_____________________             CHARGE CAPTURE:           Primary Diagnosis:     380.4 Cerumen impaction            H61.23    Impacted cerumen, bilateral              Orders:          [id number]   Removal impacted cerumen, one or both ears  (In-House)             APPTO   Appointment need  (In-House)

## 2021-05-18 NOTE — PROGRESS NOTES
Lory MorenoAmrit 1945     Office/Outpatient Visit    Visit Date: Fri, Jul 13, 2018 08:40 am    Provider: Ann Marie Steven N.P. (Assistant: Radha Lake MA)    Location: [facility]        Electronically signed by Ann Marie Steven N.P. on  07/17/2018 08:41:18 AM                             SUBJECTIVE:        CC: Pt also seen by BRENT Robles NP Robert Benson is a 72 year old White male.  jordan is here for poison ivy         HPI:         Patient complains of rash.  This has been a problem for the past 4 days.  It is located primarily over both feet.  He describes the rash as pink, red, and vesicular or bullous.  The rash is pruritic and burning.  He denies any associated symptoms.  Possible contributing factors include exposure to poison ivy.      ROS:     CONSTITUTIONAL:  Negative for chills, fatigue, fever and weight change.      CARDIOVASCULAR:  Negative for chest pain, orthopnea, paroxysmal nocturnal dyspnea and pedal edema.      RESPIRATORY:  Negative for dyspnea and cough.      GASTROINTESTINAL:  Negative for abdominal pain, heartburn, constipation, diarrhea, and stool changes.      INTEGUMENTARY:  Positive for pruritis and rash (bilateral lower legs; c/o itching and burning).      PSYCHIATRIC:  Negative for anxiety and depression.          PMH/FMH/SH:     Last Reviewed on 7/13/2018 09:21 AM by Ann Marie Steven    Past Medical History:             PAST MEDICAL HISTORY         Hyperlipidemia: Hypercholesterolemia;     Type 2 Diabetes     gait left foot    Gout         PAST MEDICAL HISTORY             CURRENT MEDICAL PROVIDERS:    Cardiologist: Danika - Cardiothoracic Vascular surgeon    Graham - cardiologist         PREVENTIVE HEALTH MAINTENANCE             COLORECTAL CANCER SCREENING: Up to date (colonoscopy q10y; sigmoidoscopy q5y; Cologuard q3y) was last done 04/2011, Results are in chart; colonoscopy with normal results; diverticulosis     EYE EXAM: Diabetic Eye Exam during this  calendar year and results are in chart was last done 08/2017         Surgical History:         Positive for    Coronary Artery Bypass Graft: 3-V 12/2016 at age 71; ;     Positive for    Kidney Mass - Benign;;     Positive for    Cataract Removal,    AICD 3/2017; and    Left finger gangrene amputation;;         Family History:         Positive for Coronary Artery Disease ( father; mother ), Myocardial Infarction ( father; mother ) and Stroke-Father.      Positive for Prostate Cancer ( father ).      Positive for Type 2 Diabetes ( mother; sister ).          Tobacco/Alcohol/Supplements:     Last Reviewed on 7/13/2018 09:21 AM by Ann Marie Steven    Tobacco: He has a past history of cigarette smoking; quit date:  30 yrs ago.          Substance Abuse History:     Last Reviewed on 7/13/2018 09:21 AM by Ann Marie Steven        Mental Health History:     Last Reviewed on 7/13/2018 09:21 AM by Ann Marie Steven        Communicable Diseases (eg STDs):     Last Reviewed on 7/13/2018 09:21 AM by Ann Marie Steven            Current Problems:     Last Reviewed on 7/13/2018 09:21 AM by Ann Marie Steven    Low back pain     Lower back pain     Acute gouty arthropathy     Congestive heart failure     Anemia of other chronic disease     CAD     Atrial fibrillation     Abdominal aortic aneurysm     Anxiety with depression     Type 2 diabetes (mild)     Mixed hyperlipidemia     HTN     Poison ivy     Cellulitis of lower extremity         Immunizations:     Td adult 11/2/2007     zzPrevnar-13 8/17/2015     Flulaval 9/20/2011     Fluzone (3 + years dose) 10/26/2006     Fluzone (3 + years dose) 11/2/2007     Fluzone (3 + years dose) 10/10/2008     Fluzone (3 + years dose) 9/23/2009     Fluzone (3 + years dose) 9/20/2010     Fluzone (3 + years dose) 10/19/2012     Fluzone pf (3+ years dose) 10/29/2013     Fluzone High-Dose pf (>=65 yr) 11/19/2014     Fluzone High-Dose pf (>=65 yr) 10/6/2015     Fluzone High-Dose pf (>=65 yr) 10/24/2016      Fluzone High-Dose pf (>=65 yr) 10/19/2017     PNEUMOVAX 23 (Pneumococcal PPV23) 10/18/2010         Allergies:     Last Reviewed on 7/13/2018 09:21 AM by Ann Marie Steven    Norco:    Lisinopril: cough (Adverse Reaction)        Current Medications:     Last Reviewed on 7/13/2018 09:21 AM by Ann Marie Steven    Citalopram Hydrobromide 20mg Tablet Take 1 tablet(s) by mouth daily     Furosemide 40mg Tablets Take 1 tablet(s) by mouth daily     Metoprolol Tartrate 25mg Tablet Take 1 tablet(s) by mouth twice daily     Pantoprazole 40mg Tablets, Delayed Release Take 1 tablet(s) by mouth daily     Tamsulosin HCl 0.4mg Capsules 1 tab daily     Potassium Chloride 10mEq Tablets, Extended Release     Amiodarone HCl 200mg Tablet 1 tab twice daily     Aspirin (ASA) 325mg Caplet 1 tab daily     Atorvastatin Calcium 80mg Tablet 1 tab daily     Eliquis 5mg Tablet Take 1 tablet(s) by mouth bid         OBJECTIVE:        Vitals:         Current: 7/13/2018 8:43:39 AM    Ht:  6 ft, 2 in;  Wt: 246 lbs;  BMI: 31.6    T: 97.3 F (oral);  BP: 110/68 mm Hg (left arm, sitting);  P: 68 bpm (left arm (BP Cuff), sitting);  sCr: 1.31 mg/dL;  GFR: 59.06        Exams:     PHYSICAL EXAM:     GENERAL: Vitals recorded well developed, well nourished;  well groomed;  no apparent distress;     RESPIRATORY: normal respiratory rate and pattern with no distress; normal breath sounds with no rales, rhonchi, wheezes or rubs;     CARDIOVASCULAR: normal rate; rhythm is regular;  no systolic murmur; no cyanosis; no edema;     GASTROINTESTINAL: nontender, nondistended; no hepatosplenomegaly or masses; no bruits;     SKIN: Vesicular, pink, red lesions to lower extremities; scabbing;  skin surrounding is erythemic;  non-draining;     MUSCULOSKELETAL:  Normal range of motion, strength and tone;     NEUROLOGIC: mental status: alert and oriented x 3;     PSYCHIATRIC:  appropriate affect and demeanor; normal speech pattern; grossly normal memory;         ASSESSMENT:            692.6   L23.7  Poison ivy              DDx:     682.6   L03.115   L03.116  Cellulitis of lower extremity              DDx:         ORDERS:         Meds Prescribed:       Prednisone 20mg Tablet 2 PO daily x 5 days  #10 (Ten) tablet(s) Refills: 0       Keflex (Cephalexin) 500mg Capsules Take 1 capsule(s) by mouth q12h for 10 days  #20 (Twenty) capsule(s) Refills: 0       Triamcinolone Acetonide 0.1% Cream Apply thin film to affected area bid , prn  #60 (Sixty) gm Refills: 1                 PLAN:          Poison ivy Pt instructed to stop Prednisone when s/s are relieved           Prescriptions:       Prednisone 20mg Tablet 2 PO daily x 5 days  #10 (Ten) tablet(s) Refills: 0       Triamcinolone Acetonide 0.1% Cream Apply thin film to affected area bid , prn  #60 (Sixty) gm Refills: 1           Patient Education Handouts:       Poison Ivy, [place]           Cellulitis of lower extremity         FOLLOW-UP: Advised to call if there is no improvement..   Chronic visit follow up           Prescriptions:       Keflex (Cephalexin) 500mg Capsules Take 1 capsule(s) by mouth q12h for 10 days  #20 (Twenty) capsule(s) Refills: 0           Patient Education Handouts:       Cellulitis              Patient Recommendations:        For  Cellulitis of lower extremity:                     APPOINTMENT INFORMATION:        Monday Tuesday Wednesday Thursday Friday Saturday Sunday            Time:___________________AM  PM   Date:_____________________             CHARGE CAPTURE:           Primary Diagnosis:     692.6 Poison ivy            L23.7    Allergic contact dermatitis due to plants, except food              Orders:          61988   Office/outpatient visit; established patient, level 3  (In-House)           682.6 Cellulitis of lower extremity            L03.115    Cellulitis of right lower limb           L03.116    Cellulitis of left lower limb

## 2021-05-18 NOTE — PROGRESS NOTES
Josh Williamluma TORRES 1945     Office/Outpatient Visit    Visit Date: Tue, Jan 23, 2018 02:44 pm    Provider: Krishna Medrano MD (Assistant: Kami Farmer MA)    Location: [facility]        Electronically signed by Krishna Medrano MD on  01/23/2018 06:08:45 PM                             SUBJECTIVE:        CC: cough         HPI:     Marcelino is in today for follow up on cough and congestion.  He was seen at the urgent care clinic over the weekend.  He was tested for flu and had an X-ray.  He was released on some Tessalon.  He says that he continues to cough.  It is worse at night for him.  He feels somewhat short of breath and weak.  He feels like he is wheezing somewhat as well.  He does have history of CAD and expresses some concern about whether this could be related to the heart.  He was previously on furosemide.  However, he has not taken that for consistently other than the last three days.      ROS:     CONSTITUTIONAL:  Negative for chills and fever.      CARDIOVASCULAR:  Negative for chest pain and palpitations.      RESPIRATORY:  Positive for recent cough and dyspnea.      GASTROINTESTINAL:  Negative for abdominal pain, nausea and vomiting.      INTEGUMENTARY:  Negative for atypical mole(s) and rash.          [facility]/SH:     Last Reviewed on 1/23/2018 03:24 PM by Krishna Medrano    Past Medical History:             PAST MEDICAL HISTORY         Hyperlipidemia: Hypercholesterolemia;     Type 2 Diabetes     gait left foot    Gout         PAST MEDICAL HISTORY             CURRENT MEDICAL PROVIDERS:    Cardiologist: Danika - Cardiothoracic Vascular surgeon    Graham - cardiologist         PREVENTIVE HEALTH MAINTENANCE             COLORECTAL CANCER SCREENING: Up to date (colonoscopy q10y; sigmoidoscopy q5y; Cologuard q3y) was last done 04/2011, Results are in chart; colonoscopy with normal results; diverticulosis         Surgical History:         Positive for    Coronary Artery Bypass Graft:  3-V 12/2016 at age 71; ;     Positive for    Kidney Mass - Benign;;     Positive for    Cataract Removal,    AICD 3/2017; and    Left finger gangrene amputation;;         Family History:         Positive for Coronary Artery Disease ( father; mother ), Myocardial Infarction ( father; mother ) and Stroke-Father.      Positive for Prostate Cancer ( father ).      Positive for Type 2 Diabetes ( mother; sister ).          Tobacco/Alcohol/Supplements:     Last Reviewed on 1/23/2018 03:24 PM by Krishna Medrano    Tobacco: He has a past history of cigarette smoking; quit date:  30 yrs ago.          Substance Abuse History:     Last Reviewed on 1/23/2018 03:24 PM by Krishna Medrano        Mental Health History:     Last Reviewed on 1/23/2018 03:24 PM by Krishna Medrano        Communicable Diseases (eg STDs):     Last Reviewed on 1/23/2018 03:24 PM by Krishna Medrano            Current Problems:     Last Reviewed on 1/23/2018 03:24 PM by Krishna Medrano    Anemia of other chronic disease     CAD     Atrial fibrillation     Abdominal aortic aneurysm     Anxiety with depression     Type 2 diabetes (mild)     Mixed hyperlipidemia     HTN         Immunizations:     Td adult 11/2/2007     Prevnar-13 8/17/2015     Flulaval 9/20/2011     Fluzone (3 + years dose) 10/26/2006     Fluzone (3 + years dose) 11/2/2007     Fluzone (3 + years dose) 10/10/2008     Fluzone (3 + years dose) 9/23/2009     Fluzone (3 + years dose) 9/20/2010     Fluzone (3 + years dose) 10/19/2012     Fluzone pf (3+ years dose) 10/29/2013     Fluzone High-Dose pf (>=65 yr) 11/19/2014     Fluzone High-Dose pf (>=65 yr) 10/6/2015     Fluzone High-Dose pf (>=65 yr) 10/24/2016     Fluzone High-Dose pf (>=65 yr) 10/19/2017     PNEUMOVAX 23 (Pneumococcal PPV23) 10/18/2010         Allergies:     Last Reviewed on 1/23/2018 03:24 PM by Krishna Medrano    Norco:    Lisinopril: cough (Adverse Reaction)        Current Medications:      "Last Reviewed on 1/23/2018 03:24 PM by Krishna Medrano    Metoprolol Tartrate 25mg Tablet Take 1 tablet(s) by mouth twice daily     Pantoprazole 40mg Tablets, Delayed Release Take 1 tablet(s) by mouth daily     Tamsulosin HCl 0.4mg Capsules 1 tab daily     Citalopram Hydrobromide 20mg Tablet Take 1 tablet(s) by mouth daily     Furosemide 40mg Tablets Take 1 tablet(s) by mouth daily     Potassium Chloride 10mEq Tablets, Extended Release     Amiodarone HCl 200mg Tablet 1 tab twice daily     Aspirin (ASA) 325mg Caplet 1 tab daily     Atorvastatin Calcium 80mg Tablet 1 tab daily     Eliquis 5mg Tablet Take 1 tablet(s) by mouth bid     TRUE Metrix Air Meter  Kit check BS QD DX E11.9     TRUE Metrix Test Strips  Reagent Strips Check blood sugar once daily. DX E11.9     TRUEplus Single-Use Lancets 33G  Lancet test blood sugar daily E 11.9     Prednisone 20mg Tablet Take  2 tablet(s) by mouth qd         OBJECTIVE:        Vitals:         Current: 1/23/2018 2:47:19 PM    Ht:  6 ft, 2 in;  Wt: 244.2 lbs;  BMI: 31.4    T: 97.7 F (oral);  BP: 111/66 mm Hg (right arm, sitting);  P: 76 bpm (right arm (BP Cuff), sitting);  sCr: 1.31 mg/dL;  GFR: 58.87        Exams:     PHYSICAL EXAM:     GENERAL: Vitals recorded well developed, well nourished;     EYES: extraocular movements intact; conjunctiva and cornea are normal; PERRL;     E/N/T: EARS:  normal external auditory canals and tympanic membranes;  grossly normal hearing; OROPHARYNX:  normal mucosa, dentition, gingiva, and posterior pharynx;     NECK: range of motion is normal; thyroid is non-palpable;     RESPIRATORY: normal respiratory rate and pattern with no distress; rales (\"crackles\") present in the right mid-lung field and RLL;     CARDIOVASCULAR: normal rate; rhythm is regular;  no systolic murmur; trace pedal edema;     GASTROINTESTINAL: nontender; normal bowel sounds; no masses;     SKIN:  no significant rashes or lesions; no suspicious moles;         ASSESSMENT   "         786.2   R05  Cough              DDx:     428.0   I50.22  Congestive heart failure              DDx:         PLAN:          Cough         RECOMMENDATIONS given include: I don't have a clear picture of what is going on with him.  We will contact [facility] for a copy of his X-ray and evaluation there.  I am concerned about the use of furosemide and have recommended that he take it regularly for the time being - once daily.  We will be in touch with him after reviewing the X-ray report.  No other changes is made for now.  His recent labs are noted.  Consider whether amiodarone may be impacting him as well..            Patient Education Handouts:       Acute Bronchitis              CHARGE CAPTURE           **Please note: ICD descriptions below are intended for billing purposes only and may not represent clinical diagnoses**        Primary Diagnosis:         786.2 Cough            R05    Cough              Orders:          [id number]   Office/outpatient visit; established patient, level 4  (In-House)           428.0 Congestive heart failure            I50.22    Chronic systolic (congestive) heart failure

## 2021-05-18 NOTE — PROGRESS NOTES
Josh Jonoyan TORERS 1945     Office/Outpatient Visit    Visit Date: Tue, May 28, 2019 12:55 pm    Provider: Krishna Medrano MD (Assistant: Leelee Brito RN)    Location: [facility]        Electronically signed by Krishna Medrano MD on  05/29/2019 07:25:57 PM                             SUBJECTIVE:        CC: pain follow up, hematoma, foot pain         HPI:     Marcelino is in today for follow up on pain.  He had an incident where a cow ran into him and knocked him down.  He had a pretty significant hematoma that was evident on the buttocks, and he went to the ER for evaluation where he was treated and released.  He had a lot of difficulty after that with getting up and around, but he has seen some improvement in the last bit.  He wanted to be seen today for evaluation of ongoing pain though.  He continues to have pain in the lower back especially on the R side.  He is having pain that is interrupting sleep.          With regard to the right foot, he noted some pain and swelling over the weekend and wanted to have it looked at.  He has noted some increased swelling since the injury.  He denies significant calf tenderness though.        He was given some pain medication in the ER and would like to have that refilled if possible.  He denies worrisome side effects with the hyrodocone.  He denies history of addiction issues.      ROS:     CONSTITUTIONAL:  Negative for chills and fever.      CARDIOVASCULAR:  Negative for chest pain and palpitations.      RESPIRATORY:  Negative for recent cough and dyspnea.      GASTROINTESTINAL:  Negative for abdominal pain, nausea and vomiting.          PM/[facility]/SH:     Last Reviewed on 2/28/2019 09:10 AM by Krishna Medrano    Past Medical History:             PAST MEDICAL HISTORY         Hyperlipidemia: Hypercholesterolemia;     Type 2 Diabetes     gait left foot    Gout         PAST MEDICAL HISTORY             CURRENT MEDICAL PROVIDERS:    Cardiologist: Danika Flannery  Cardiothoracic Vascular surgeon    Graham - cardiologist         PREVENTIVE HEALTH MAINTENANCE             COLORECTAL CANCER SCREENING: Up to date (colonoscopy q10y; sigmoidoscopy q5y; Cologuard q3y) was last done 04/2011, Results are in chart; colonoscopy with normal results; diverticulosis     EYE EXAM: Diabetic Eye Exam during this calendar year and results are in chart was last done 08/2017         Surgical History:         Positive for    Coronary Artery Bypass Graft: 3-V 12/2016 at age 71; ;     Positive for    Kidney Mass - Benign;;     Positive for    Cataract Removal,    AICD 3/2017; and    Left finger gangrene amputation;;         Family History:         Positive for Coronary Artery Disease ( father; mother ), Myocardial Infarction ( father; mother ) and Stroke-Father.      Positive for Prostate Cancer ( father ).      Positive for Type 2 Diabetes ( mother; sister ).          Tobacco/Alcohol/Supplements:     Last Reviewed on 2/28/2019 09:10 AM by Krishna Medrano    Tobacco: He has a past history of cigarette smoking; quit date:  30 yrs ago.          Substance Abuse History:     Last Reviewed on 2/28/2019 09:10 AM by Krishna Medrano        Mental Health History:     Last Reviewed on 2/28/2019 09:10 AM by Krishna Medrano        Communicable Diseases (eg STDs):     Last Reviewed on 2/28/2019 09:10 AM by Krishna Medrano            Current Problems:     Last Reviewed on 2/28/2019 09:10 AM by Krishna Medrano    Hematuria, unspecified     BPH     GERD     Low back pain     Lower back pain     Acute gouty arthropathy     Congestive heart failure     Anemia of other chronic disease     CAD     Atrial fibrillation     Abdominal aortic aneurysm     Anxiety with depression     Type 2 diabetes (mild)     Mixed hyperlipidemia     HTN         Immunizations:     Td adult 11/2/2007     zzPrevnar-13 8/17/2015     Flulaval 9/20/2011     Fluzone (3 + years dose) 10/26/2006     Fluzone (3 +  years dose) 11/2/2007     Fluzone (3 + years dose) 10/10/2008     Fluzone (3 + years dose) 9/23/2009     Fluzone (3 + years dose) 9/20/2010     Fluzone (3 + years dose) 10/19/2012     Fluzone pf (3+ years dose) 10/29/2013     Fluzone High-Dose pf (>=65 yr) 11/19/2014     Fluzone High-Dose pf (>=65 yr) 10/6/2015     Fluzone High-Dose pf (>=65 yr) 10/24/2016     Fluzone High-Dose pf (>=65 yr) 10/19/2017     Fluzone High-Dose pf (>=65 yr) 10/15/2018     PNEUMOVAX 23 (Pneumococcal PPV23) 10/18/2010         Allergies:     Last Reviewed on 2/28/2019 09:10 AM by Krishna Medranoco:    Lisinopril: cough (Adverse Reaction)        Current Medications:     Last Reviewed on 2/28/2019 09:10 AM by Krishna Medrano    TRUE Metrix Test Strips  Reagent Strips Check blood sugar once daily. DX E11.9     TRUEplus Single-Use Lancets 33G  Lancet test blood sugar daily E 11.9     Citalopram Hydrobromide 20mg Tablet 1 pill daily     Furosemide 40mg Tablets Take 1 tablet(s) by mouth daily     Pantoprazole 40mg Tablets, Delayed Release Take 1 tablet(s) by mouth daily     Tamsulosin HCl 0.4mg Capsules 1 tab daily     Metoprolol Tartrate 25mg Tablet Take 1 tablet(s) by mouth twice daily     Potassium Chloride 10mEq Tablets, Extended Release     Amiodarone HCl 200mg Tablet 1 tab twice daily     Aspirin (ASA) 325mg Caplet 1 tab daily     Atorvastatin Calcium 80mg Tablet 1 tab daily     Eliquis 5mg Tablet Take 1 tablet(s) by mouth bid         OBJECTIVE:        Vitals:         Current: 5/28/2019 1:00:15 PM    Ht:  6 ft, 2 in;  Wt: 244 lbs;  BMI: 31.3    T: 97.8 F (oral);  BP: 113/55 mm Hg (left arm, sitting);  P: 65 bpm (left arm (BP Cuff), sitting);  sCr: 1.14 mg/dL;  GFR: 66.67        Exams:     PHYSICAL EXAM:     GENERAL: Vitals recorded well developed, well nourished;     NECK: range of motion is normal; thyroid is non-palpable;     RESPIRATORY: normal respiratory rate and pattern with no distress; normal breath sounds with  no rales, rhonchi, wheezes or rubs;     CARDIOVASCULAR: normal rate; rhythm is regular;  no systolic murmur;     GASTROINTESTINAL: nontender; normal bowel sounds; no masses;     LYMPHATIC: no enlargement of cervical or facial nodes; no supraclavicular nodes;     SKIN:  no significant rashes or lesions; no suspicious moles;     MUSCULOSKELETAL: there is pain in the R flank and lower back - no hematoma in the buttocks is noted - there is some mild edema in the R lateral ankle - no pain in the calf is noted;     NEUROLOGIC: mental status: alert and oriented x 3; cranial nerves II-XII grossly intact;         Lab/Test Results:             Urine temperature:  confimed (05/28/2019),     All urine drug screen levels confirmed negative:  yes (05/28/2019),     Date and time of last pill:  new script/AS (05/28/2019),     Performed by:  isiah (05/28/2019),     Collection Time:  1356 (05/28/2019),             ASSESSMENT           724.2   M54.5  Low back pain              DDx:     719.47   M79.671  Foot joint pain              DDx:     922.32   S30.0XXD  Hematoma of buttock              DDx:         ORDERS:         Meds Prescribed:       Norco (Hydrocodone/Acetaminophen) 5mg/325mg Tablet Take 1 tablet(s) by mouth q4h prn  #18 (Eighteen) tablet(s) Refills: 0         Lab Orders:       [id number]  Drug test prsmv qual dir optical obs per day  (In-House)           Procedures Ordered:       REFER  Referral to Specialist or Other Facility  (Send-Out)                   PLAN:          Low back pain     Duck had pretty significant injuries as evidenced by the ER record as well as ongoing pain.  He is seeing improvement, but the pain is limiting sleep.  He would like to have additional pain medication available.  Given his medical history, I would prefer to avoid NSAIDs or other similar pain medications.  Willi is reviewed and does not show concerning pattern.  He has tolerated hydrocodone well.  We will go ahead and refill that for him as  below.  Risks are reviewed including potential for addiction and overdose.  We will get his consent for this and drug screen.  No other changes for now.  I would expect gradual improvement.  In addition, we will refer him to PT for evaluation and treatment.  I'd like to get him closer to his usual baseline as soon as possible.      LABORATORY:  Labs ordered to be performed today include Drug screen.      REFERRALS:  Referral initiated to physical therapy ( [facility] ).            Prescriptions:       Norco (Hydrocodone/Acetaminophen) 5mg/325mg Tablet Take 1 tablet(s) by mouth q4h prn  #18 (Eighteen) tablet(s) Refills: 0           Orders:       [id number]  Drug test prsmv qual dir optical obs per day  (In-House)         REFER  Referral to Specialist or Other Facility  (Send-Out)             Patient Education Handouts:       Controlled Prescription Drug education           Foot joint pain As above.          Hematoma of buttock As above.             CHARGE CAPTURE           **Please note: ICD descriptions below are intended for billing purposes only and may not represent clinical diagnoses**        Primary Diagnosis:         724.2 Low back pain            M54.5    Low back pain              Orders:          [id number]   Office/outpatient visit; established patient, level 4  (In-House)             92739   Drug test prsmv qual dir optical obs per day  (In-House)           719.47 Foot joint pain            M79.671    Pain in right foot    922.32 Hematoma of buttock            S30.0XXD    Contusion of lower back and pelvis, subsequent encounter

## 2021-05-18 NOTE — PROGRESS NOTES
Josh Williamluma TORRES 1945     Office/Outpatient Visit    Visit Date: Thu, Sep 26, 2019 01:00 pm    Provider: Krishna Medrano MD (Assistant: Giselle Hardy)    Location: [facility]        Electronically signed by Krishna Medrano MD on  09/27/2019 08:02:53 AM                             SUBJECTIVE:        CC: 'I got this numbness in my arms'         HPI:     Marcelino is in today for follow up on some numbness in the arms.  He says that there is a soreness and some decreased sensation in the wrists.  He has a feeling like needles in the hands most of the time.  He does have some neck pain from time to time, but this is not a big complaint.  He has noted some decrease in function with buttoning for example.         Dx with type 2 diabetes (mild); most recent lab results include Hemoglobin A1c:  6.9 (%) (02/28/2019), LDL:  76 (mg/dL) (02/28/2019).          Mixed hyperlipidemia details; current treatment includes Lipitor.  Compliance with treatment has been good; he takes his medication as directed and follows up as directed.          In regard to the atrial fibrillation, he is here for routine follow-up for atrial fibrillation. Current related medications include amiodarone and Eliquis.   He is extremely compliant with his medication regimen.          Marcelino also has some history of anxiety for which he takes citalopram.  He denies any recent change in that regard.     ROS:     CONSTITUTIONAL:  Negative for chills and fever.      CARDIOVASCULAR:  Negative for chest pain and palpitations.      RESPIRATORY:  Negative for recent cough and dyspnea.      GASTROINTESTINAL:  Negative for abdominal pain, nausea and vomiting.      INTEGUMENTARY:  Negative for atypical mole(s) and rash.          PMH/FM[facility]/SH:     Last Reviewed on 2/28/2019 09:10 AM by Krishna Medrano    Past Medical History:             PAST MEDICAL HISTORY         Hyperlipidemia: Hypercholesterolemia;     Type 2 Diabetes     gait left foot     Gout         PAST MEDICAL HISTORY             CURRENT MEDICAL PROVIDERS:    Cardiologist: Danika - Cardiothoracic Vascular surgeon    Graham - cardiologist         PREVENTIVE HEALTH MAINTENANCE             COLORECTAL CANCER SCREENING: Up to date (colonoscopy q10y; sigmoidoscopy q5y; Cologuard q3y) was last done 04/2011, Results are in chart; colonoscopy with normal results; diverticulosis     EYE EXAM: Diabetic Eye Exam during this calendar year and results are in chart was last done 08/2017         Surgical History:         Positive for    Coronary Artery Bypass Graft: 3-V 12/2016 at age 71; ;     Positive for    Kidney Mass - Benign;;     Positive for    Cataract Removal,    AICD 3/2017; and    Left finger gangrene amputation;;         Family History:         Positive for Coronary Artery Disease ( father; mother ), Myocardial Infarction ( father; mother ) and Stroke-Father.      Positive for Prostate Cancer ( father ).      Positive for Type 2 Diabetes ( mother; sister ).          Tobacco/Alcohol/Supplements:     Last Reviewed on 5/28/2019 12:56 PM by Leelee Brito    Tobacco: He has a past history of cigarette smoking; quit date:  30 yrs ago.          Substance Abuse History:     Last Reviewed on 2/28/2019 09:10 AM by Krishna Medrano        Mental Health History:     Last Reviewed on 2/28/2019 09:10 AM by Krishna Medrano        Communicable Diseases (eg STDs):     Last Reviewed on 2/28/2019 09:10 AM by Krishna Medrano            Current Problems:     Last Reviewed on 2/28/2019 09:10 AM by Krishna Medrano    Low back pain     Hematuria, unspecified     BPH     GERD     Lower back pain     Acute gouty arthropathy     Congestive heart failure     Anemia of other chronic disease     CAD     Atrial fibrillation     Abdominal aortic aneurysm     Anxiety with depression     Type 2 diabetes (mild)     Mixed hyperlipidemia     HTN         Immunizations:     Td adult 11/2/2007      zzPrevnar-13 8/17/2015     Flulaval 9/20/2011     Fluzone (3 + years dose) 10/26/2006     Fluzone (3 + years dose) 11/2/2007     Fluzone (3 + years dose) 10/10/2008     Fluzone (3 + years dose) 9/23/2009     Fluzone (3 + years dose) 9/20/2010     Fluzone (3 + years dose) 10/19/2012     Fluzone pf (3+ years dose) 10/29/2013     Fluzone High-Dose pf (>=65 yr) 11/19/2014     Fluzone High-Dose pf (>=65 yr) 10/6/2015     Fluzone High-Dose pf (>=65 yr) 10/24/2016     Fluzone High-Dose pf (>=65 yr) 10/19/2017     Fluzone High-Dose pf (>=65 yr) 10/15/2018     Fluzone High-Dose pf (>=65 yr) 9/23/2019     PNEUMOVAX 23 (Pneumococcal PPV23) 10/18/2010         Allergies:     Last Reviewed on 5/28/2019 12:56 PM by Leelee Brito    Norco:    Lisinopril: cough (Adverse Reaction)        Current Medications:     Last Reviewed on 5/28/2019 12:58 PM by Leelee Brito    Furosemide 40mg Tablets Take 1 tablet(s) by mouth daily     Metoprolol Tartrate 25mg Tablet Take 1 tablet(s) by mouth twice daily     Pantoprazole 40mg Tablets, Delayed Release Take 1 tablet(s) by mouth daily     Tamsulosin HCl 0.4mg Capsules 1 tab daily     Citalopram Hydrobromide 20mg Tablet 1 pill daily     TRUE Metrix Test Strips  Reagent Strips Check blood sugar once daily. DX E11.9     TRUEplus Single-Use Lancets 33G  Lancet test blood sugar daily E 11.9     Potassium Chloride 10mEq Tablets, Extended Release     Amiodarone HCl 200mg Tablet 1 tab twice daily     Aspirin (ASA) 325mg Caplet 1 tab daily     Atorvastatin Calcium 80mg Tablet 1 tab daily     Eliquis 5mg Tablet Take 1 tablet(s) by mouth bid         OBJECTIVE:        Vitals:         Current: 9/26/2019 1:06:05 PM    Ht:  6 ft, 2 in;  Wt: 244 lbs;  BMI: 31.3    T: 97.6 F (oral);  BP: 94/64 mm Hg (right arm, sitting);  P: 72 bpm (right arm (BP Cuff), sitting);  sCr: 1.14 mg/dL;  GFR: 66.67    O2 Sat: 95 % (room air)        Exams:     PHYSICAL EXAM:     GENERAL: Vitals recorded well developed, well  nourished;     NECK: range of motion is decreased with rotation in either direction;  thyroid is non-palpable;     RESPIRATORY: normal respiratory rate and pattern with no distress; normal breath sounds with no rales, rhonchi, wheezes or rubs;     CARDIOVASCULAR: normal rate; rhythm is regular;  no systolic murmur;     GASTROINTESTINAL: nontender; normal bowel sounds; no masses;     LYMPHATIC: no enlargement of cervical or facial nodes; no supraclavicular nodes;     SKIN:  no significant rashes or lesions; no suspicious moles;     NEUROLOGIC: mental status: alert and oriented x 3; cranial nerves II-XII grossly intact; there may be mild weakness of  strength;         ASSESSMENT           782.0   R20.2  Numbness              DDx:     250.00   E11.9  Type 2 diabetes (mild)              DDx:     272.2   E78.2  Mixed hyperlipidemia              DDx:     427.31   I48.0  Atrial fibrillation              DDx:     300.4   F41.3  Anxiety with depression              DDx:         ORDERS:         Meds Prescribed:       Refill of: Pantoprazole 40mg Tablets, Delayed Release Take 1 tablet(s) by mouth daily  #90 (Ninety) tablet(s) Refills: 1       Refill of: Tamsulosin HCl 0.4mg Capsules 1 tab daily  #90 (Ninety) capsule(s) Refills: 1       Refill of: Citalopram Hydrobromide 20mg Tablet Take 1 tablet(s) by mouth daily  #90 (Ninety) tablet(s) Refills: 1         Radiology/Test Orders:       [id number]  Needle electromyography, two extremities and related paraspinal areas  (Send-Out)         3017F  Colorectal CA screen results documented and reviewed (PV)  (In-House)           Lab Orders:       [id number]  COMP - H Comp. Metabolic Panel  (Send-Out)         07726  A1CEG - [facility] Hemoglobin A1C  (Send-Out)         70558  BDCB2 - [facility] CBC w/o diff  (Send-Out)         08890  TSH - H TSH  (Send-Out)         27080  CK - H- CK total  (Send-Out)         48147  B12FO - [facility] Vitamin B12 with Folate  (Send-Out)         25335  FERR - [facility] Ferritin  Serum  (Send-Out)         83344  IRON - [facility] Iron, serum  (Send-Out)         54917  MG - [facility] Magnesium, Serum  (Send-Out)           Other Orders:         Depression screen positive and follow up plan documented  (In-House)         1101F  Pt screen for fall risk; document no falls in past year or only 1 fall w/o injury in past year (LISETTE)  (In-House)                   PLAN:          Numbness     LABORATORY:  Labs ordered to be performed today include B12 with Folate, Ferritin Serum, and Iron Serum.      RECOMMENDATIONS given include: We will get labs on Duck as noted below.  EMG to be arranged.  No other near term changes.  We will follow from there..  MIPS PHQ-9 Depression Screening: Completed form scanned and in chart; Total Score 9 Positive Depression Screen: Stable on medications. No suicidal ideation.            Orders:       [id number]  Needle electromyography, two extremities and related paraspinal areas  (Send-Out)         19728  B12FO - [facility] Vitamin B12 with Folate  (Send-Out)         13511  FERR - [facility] Ferritin Serum  (Send-Out)         85462  IRON - [facility] Iron, serum  (Send-Out)           Depression screen positive and follow up plan documented  (In-House)         1101F  Pt screen for fall risk; document no falls in past year or only 1 fall w/o injury in past year (LISETTE)  (In-House)         3017F  Colorectal CA screen results documented and reviewed (PV)  (In-House)             Patient Education Handouts:       Carpal Tunnel Syndrome           Type 2 diabetes (mild)     LABORATORY:  Labs ordered to be performed today include Comprehensive metabolic panel and HgbA1C.            Orders:       [id number]  COMP - [facility] Comp. Metabolic Panel  (Send-Out)         35262  A1CEG - [facility] Hemoglobin A1C  (Send-Out)            Mixed hyperlipidemia     LABORATORY:  Labs ordered to be performed today include CK, total.            Orders:       [id number]  CK - [facility]- CK total  (Send-Out)            Atrial fibrillation     LABORATORY:   Labs ordered to be performed today include CBC W/O DIFF, Magnesium level, and TSH.            Orders:       [id number]  BDCB2 - [facility] CBC w/o diff  (Send-Out)         27557  TSH - [facility] TSH  (Send-Out)         41860  MG - [facility] Magnesium, Serum  (Send-Out)            Anxiety with depression As above.           Prescriptions:       Refill of: Citalopram Hydrobromide 20mg Tablet Take 1 tablet(s) by mouth daily  #90 (Ninety) tablet(s) Refills: 1             Other Prescriptions:       Refill of: Pantoprazole 40mg Tablets, Delayed Release Take 1 tablet(s) by mouth daily  #90 (Ninety) tablet(s) Refills: 1       Refill of: Tamsulosin HCl 0.4mg Capsules 1 tab daily  #90 (Ninety) capsule(s) Refills: 1         CHARGE CAPTURE           **Please note: ICD descriptions below are intended for billing purposes only and may not represent clinical diagnoses**        Primary Diagnosis:         782.0 Numbness            R20.2    Paresthesia of skin              Orders:          52672   Office/outpatient visit; established patient, level 4  (In-House)                Depression screen positive and follow up plan documented  (In-House)             1101F   Pt screen for fall risk; document no falls in past year or only 1 fall w/o injury in past year (LISETTE)  (In-House)             3017F   Colorectal CA screen results documented and reviewed (PV)  (In-House)           250.00 Type 2 diabetes (mild)            E11.9    Type 2 diabetes mellitus without complications    272.2 Mixed hyperlipidemia            E78.2    Mixed hyperlipidemia    427.31 Atrial fibrillation            I48.0    Paroxysmal atrial fibrillation    300.4 Anxiety with depression            F41.3    Other mixed anxiety disorders

## 2021-05-18 NOTE — PROGRESS NOTES
Lory MorenoAmrit 1945     Office/Outpatient Visit    Visit Date: Thu, Nov 29, 2018 01:08 pm    Provider: Krishna Medrano MD (Assistant: Leelee Brito RN)    Location: [facility]        Electronically signed by Krishna Medrano MD on  12/01/2018 08:56:39 AM                             SUBJECTIVE:        CC: cough         HPI:         Cough noted.  It has been present for the past several days.  Respiratory symptoms include chest congestion and cough.  Other symptoms include dizzy and some nausea.  He denies fever.  Sputum is described as moderate in volume and 'old brown yellow looking stuff'.  He has already tried to relieve the symptoms with benzonatate.          Marcelino says that he has been doing okay with regard to his stomach.  He does continue to take pantoprazole for this which is of benefit.  He denies acute issue other than what he describes above.         Marcelino also has history of BPH and remains on Flomax.  He has been doing okay with regard to urination.  He denies acute issue today.     ROS:     CONSTITUTIONAL:  Positive for chills.   Negative for fever.      CARDIOVASCULAR:  Negative for chest pain and palpitations.      GASTROINTESTINAL:  Positive for nausea.   Negative for abdominal pain or vomiting.      INTEGUMENTARY:  Negative for atypical mole(s) and rash.          PMH/FMH/SH:     Last Reviewed on 11/29/2018 01:27 PM by Krishna Medrano    Past Medical History:             PAST MEDICAL HISTORY         Hyperlipidemia: Hypercholesterolemia;     Type 2 Diabetes     gait left foot    Gout         PAST MEDICAL HISTORY             CURRENT MEDICAL PROVIDERS:    Cardiologist: Danika - Cardiothoracic Vascular surgeon    Graham - cardiologist         PREVENTIVE HEALTH MAINTENANCE             COLORECTAL CANCER SCREENING: Up to date (colonoscopy q10y; sigmoidoscopy q5y; Cologuard q3y) was last done 04/2011, Results are in chart; colonoscopy with normal results; diverticulosis     EYE  EXAM: Diabetic Eye Exam during this calendar year and results are in chart was last done 08/2017         Surgical History:         Positive for    Coronary Artery Bypass Graft: 3-V 12/2016 at age 71; ;     Positive for    Kidney Mass - Benign;;     Positive for    Cataract Removal,    AICD 3/2017; and    Left finger gangrene amputation;;         Family History:         Positive for Coronary Artery Disease ( father; mother ), Myocardial Infarction ( father; mother ) and Stroke-Father.      Positive for Prostate Cancer ( father ).      Positive for Type 2 Diabetes ( mother; sister ).          Tobacco/Alcohol/Supplements:     Last Reviewed on 11/29/2018 01:27 PM by Krishna Medrano    Tobacco: He has a past history of cigarette smoking; quit date:  30 yrs ago.          Substance Abuse History:     Last Reviewed on 11/29/2018 01:27 PM by Krishna Medrano        Mental Health History:     Last Reviewed on 11/29/2018 01:27 PM by Krishna Medrano        Communicable Diseases (eg STDs):     Last Reviewed on 11/29/2018 01:27 PM by Krishna Medrano            Current Problems:     Last Reviewed on 11/29/2018 01:27 PM by Krishna Medrano    Low back pain     Lower back pain     Acute gouty arthropathy     Congestive heart failure     Anemia of other chronic disease     CAD     Atrial fibrillation     Abdominal aortic aneurysm     Anxiety with depression     Type 2 diabetes (mild)     Mixed hyperlipidemia     HTN     Closed fracture of toe     Blister on foot and toe(s)     Hematoma of back         Immunizations:     Td adult 11/2/2007     zzPrevnar-13 8/17/2015     Flulaval 9/20/2011     Fluzone (3 + years dose) 10/26/2006     Fluzone (3 + years dose) 11/2/2007     Fluzone (3 + years dose) 10/10/2008     Fluzone (3 + years dose) 9/23/2009     Fluzone (3 + years dose) 9/20/2010     Fluzone (3 + years dose) 10/19/2012     Fluzone pf (3+ years dose) 10/29/2013     Fluzone High-Dose pf (>=65 yr)  11/19/2014     Fluzone High-Dose pf (>=65 yr) 10/6/2015     Fluzone High-Dose pf (>=65 yr) 10/24/2016     Fluzone High-Dose pf (>=65 yr) 10/19/2017     Fluzone High-Dose pf (>=65 yr) 10/15/2018     PNEUMOVAX 23 (Pneumococcal PPV23) 10/18/2010         Allergies:     Last Reviewed on 11/29/2018 01:27 PM by Krishna Medrano    Norco:    Lisinopril: cough (Adverse Reaction)        Current Medications:     Last Reviewed on 11/29/2018 01:27 PM by Krishna Medrano    Citalopram Hydrobromide 20mg Tablet Take 1 tablet(s) by mouth daily     Furosemide 40mg Tablets Take 1 tablet(s) by mouth daily     Metoprolol Tartrate 25mg Tablet Take 1 tablet(s) by mouth twice daily     Pantoprazole 40mg Tablets, Delayed Release Take 1 tablet(s) by mouth daily     Tamsulosin HCl 0.4mg Capsules 1 tab daily     Potassium Chloride 10mEq Tablets, Extended Release     Amiodarone HCl 200mg Tablet 1 tab twice daily     Aspirin (ASA) 325mg Caplet 1 tab daily     Atorvastatin Calcium 80mg Tablet 1 tab daily     Eliquis 5mg Tablet Take 1 tablet(s) by mouth bid         OBJECTIVE:        Vitals:         Current: 11/29/2018 1:11:26 PM    Ht:  6 ft, 2 in;  Wt: 243.2 lbs;  BMI: 31.2    T: 98.2 F (oral);  BP: 123/59 mm Hg (left arm, sitting);  P: 65 bpm (left arm (BP Cuff), sitting);  sCr: 1.31 mg/dL;  GFR: 57.94    O2 Sat: 96 % (room air)        Exams:     PHYSICAL EXAM:     GENERAL: Vitals recorded well developed, well nourished;     EYES: extraocular movements intact; conjunctiva and cornea are normal; PERRL;     E/N/T: EARS:  normal external auditory canals and tympanic membranes;  grossly normal hearing; OROPHARYNX:  normal mucosa, dentition, gingiva, and posterior pharynx;     NECK: range of motion is normal; thyroid is non-palpable;     RESPIRATORY: normal respiratory rate and pattern with no distress; normal breath sounds with no rales, rhonchi, wheezes or rubs;     CARDIOVASCULAR: normal rate; rhythm is regular;  no systolic murmur;      GASTROINTESTINAL: nontender; normal bowel sounds; no masses;     LYMPHATIC: no enlargement of cervical or facial nodes; no supraclavicular nodes;     SKIN:  no significant rashes or lesions; no suspicious moles;         ASSESSMENT           786.2   R05  Cough              DDx:     530.81   K21.9  GERD              DDx:     600.00   N40.0  BPH              DDx:         ORDERS:         Meds Prescribed:       Refill of: Pantoprazole 40mg Tablets, Delayed Release Take 1 tablet(s) by mouth daily  #90 (Ninety) tablet(s) Refills: 1       Refill of: Tamsulosin HCl 0.4mg Capsules 1 tab daily  #90 (Ninety) capsule(s) Refills: 1       Cefdinir 300mg Capsules Take 1 capsule(s) by mouth q12h for 10 days  #20 (Twenty) capsule(s) Refills: 0       Promethazine with Dextromethrophan 6.25mg/15mg per 5ml Syrup Take 1 to 2 teaspoon by mouth q 4 to 6 hr as needed for cough  #4 (Four) oz Refills: 1         Radiology/Test Orders:       3017F  Colorectal CA screen results documented and reviewed (PV)  (In-House)           Other Orders:       02497  Noninvasive ear or pulse oximetry for oxygen saturation; single determination  (In-House)           Calculated BMI above the upper parameter and a follow-up plan was documented in the medical record  (In-House)                   PLAN:          Cough         RECOMMENDATIONS given include: Today, we have reviewed Duck's care.  We will cover him as noted below and go from there.  If he does not improve, he will return.  I am filling some of his medications as noted..  MIPS Vaccines Flu and Pneumonia updated in Shot record     COLORECTAL CANCER SCREENING: Results are in chart     BMI Elevated - Follow-Up Plan: He was provided education on weight loss strategies           Prescriptions:       Cefdinir 300mg Capsules Take 1 capsule(s) by mouth q12h for 10 days  #20 (Twenty) capsule(s) Refills: 0       Promethazine with Dextromethrophan 6.25mg/15mg per 5ml Syrup Take 1 to 2 teaspoon by mouth q 4  to 6 hr as needed for cough  #4 (Four) oz Refills: 1           Orders:       [id number]  Noninvasive ear or pulse oximetry for oxygen saturation; single determination  (In-House)         3017F  Colorectal CA screen results documented and reviewed (PV)  (In-House)           Calculated BMI above the upper parameter and a follow-up plan was documented in the medical record  (In-House)             Patient Education Handouts:       Acute Bronchitis           GERD           Prescriptions:       Refill of: Pantoprazole 40mg Tablets, Delayed Release Take 1 tablet(s) by mouth daily  #90 (Ninety) tablet(s) Refills: 1          BPH           Prescriptions:       Refill of: Tamsulosin HCl 0.4mg Capsules 1 tab daily  #90 (Ninety) capsule(s) Refills: 1             CHARGE CAPTURE           **Please note: ICD descriptions below are intended for billing purposes only and may not represent clinical diagnoses**        Primary Diagnosis:         786.2 Cough            R05    Cough              Orders:          16293   Office/outpatient visit; established patient, level 4  (In-House)             70643   Noninvasive ear or pulse oximetry for oxygen saturation; single determination  (In-House)             3017F   Colorectal CA screen results documented and reviewed (PV)  (In-House)                Calculated BMI above the upper parameter and a follow-up plan was documented in the medical record  (In-House)           530.81 GERD            K21.9    Gastro-esophageal reflux disease without esophagitis    600.00 BPH            N40.0    Enlarged prostate without lower urinary tract symptoms

## 2021-05-18 NOTE — PROGRESS NOTES
Lory MorenoAmrit 1945     Office/Outpatient Visit    Visit Date: Thu, Feb 28, 2019 08:51 am    Provider: Krishna Medrano MD (Assistant: Leelee Brito RN)    Location: [facility]        Electronically signed by Krishna Medrano MD on  03/01/2019 06:08:28 AM                             SUBJECTIVE:        CC: diabetes, blood pressure, a fib         HPI:         Patient presents with type 2 diabetes (mild).  Current meds include nothing.  He reports home blood glucose readings have been fairly good, with average fasting glucoses running in the 120-150 mg/dL range.  Most recent lab results include Hemoglobin A1c:  6.6 (%) (01/04/2018), LDL:  87 (mg/dL) (09/13/2017).          Concerning mixed hyperlipidemia, current treatment includes Lipitor.  Compliance with treatment has been good; he takes his medication as directed and follows up as directed.          Additionally, he presents with history of atrial fibrillation.  he is here for routine follow-up for atrial fibrillation. Current related medications include amiodarone and Eliquis.   He is extremely compliant with his medication regimen.      ROS:     CONSTITUTIONAL:  Negative for chills and fever.      CARDIOVASCULAR:  Negative for chest pain and palpitations.      RESPIRATORY:  Negative for recent cough and dyspnea.      GASTROINTESTINAL:  Negative for abdominal pain, nausea and vomiting.          PMH/FMH/SH:     Last Reviewed on 2/28/2019 09:10 AM by Krishna Medrano    Past Medical History:             PAST MEDICAL HISTORY         Hyperlipidemia: Hypercholesterolemia;     Type 2 Diabetes     gait left foot    Gout         PAST MEDICAL HISTORY             CURRENT MEDICAL PROVIDERS:    Cardiologist: Danika - Cardiothoracic Vascular surgeon    Graham - cardiologist         PREVENTIVE HEALTH MAINTENANCE             COLORECTAL CANCER SCREENING: Up to date (colonoscopy q10y; sigmoidoscopy q5y; Cologuard q3y) was last done 04/2011, Results are in  chart; colonoscopy with normal results; diverticulosis     EYE EXAM: Diabetic Eye Exam during this calendar year and results are in chart was last done 08/2017         Surgical History:         Positive for    Coronary Artery Bypass Graft: 3-V 12/2016 at age 71; ;     Positive for    Kidney Mass - Benign;;     Positive for    Cataract Removal,    AICD 3/2017; and    Left finger gangrene amputation;;         Family History:         Positive for Coronary Artery Disease ( father; mother ), Myocardial Infarction ( father; mother ) and Stroke-Father.      Positive for Prostate Cancer ( father ).      Positive for Type 2 Diabetes ( mother; sister ).          Tobacco/Alcohol/Supplements:     Last Reviewed on 2/28/2019 09:10 AM by Krishna Medrano    Tobacco: He has a past history of cigarette smoking; quit date:  30 yrs ago.          Substance Abuse History:     Last Reviewed on 2/28/2019 09:10 AM by Krishna Medrano        Mental Health History:     Last Reviewed on 2/28/2019 09:10 AM by Krishna Medrano        Communicable Diseases (eg STDs):     Last Reviewed on 2/28/2019 09:10 AM by Krishna Medrano            Current Problems:     Last Reviewed on 2/28/2019 09:10 AM by Krishna Medrano    BPH     GERD     Low back pain     Lower back pain     Acute gouty arthropathy     Congestive heart failure     Anemia of other chronic disease     CAD     Atrial fibrillation     Abdominal aortic aneurysm     Anxiety with depression     Type 2 diabetes (mild)     Mixed hyperlipidemia     HTN         Immunizations:     Td adult 11/2/2007     zzPrevnar-13 8/17/2015     Flulaval 9/20/2011     Fluzone (3 + years dose) 10/26/2006     Fluzone (3 + years dose) 11/2/2007     Fluzone (3 + years dose) 10/10/2008     Fluzone (3 + years dose) 9/23/2009     Fluzone (3 + years dose) 9/20/2010     Fluzone (3 + years dose) 10/19/2012     Fluzone pf (3+ years dose) 10/29/2013     Fluzone High-Dose pf (>=65 yr)  11/19/2014     Fluzone High-Dose pf (>=65 yr) 10/6/2015     Fluzone High-Dose pf (>=65 yr) 10/24/2016     Fluzone High-Dose pf (>=65 yr) 10/19/2017     Fluzone High-Dose pf (>=65 yr) 10/15/2018     PNEUMOVAX 23 (Pneumococcal PPV23) 10/18/2010         Allergies:     Last Reviewed on 2/28/2019 09:10 AM by Krishna Medrano    Norco:    Lisinopril: cough (Adverse Reaction)        Current Medications:     Last Reviewed on 2/28/2019 09:10 AM by Krishna Medrano    Furosemide 40mg Tablets Take 1 tablet(s) by mouth daily     Pantoprazole 40mg Tablets, Delayed Release Take 1 tablet(s) by mouth daily     Tamsulosin HCl 0.4mg Capsules 1 tab daily     Metoprolol Tartrate 25mg Tablet Take 1 tablet(s) by mouth twice daily     Potassium Chloride 10mEq Tablets, Extended Release     Amiodarone HCl 200mg Tablet 1 tab twice daily     Aspirin (ASA) 325mg Caplet 1 tab daily     Atorvastatin Calcium 80mg Tablet 1 tab daily     Eliquis 5mg Tablet Take 1 tablet(s) by mouth bid     Citalopram Hydrobromide 20mg Tablet 1 pill daily         OBJECTIVE:        Vitals:         Current: 2/28/2019 8:56:42 AM    Ht:  6 ft, 2 in;  Wt: 250 lbs;  BMI: 32.1    T: 97.9 F (oral);  BP: 109/59 mm Hg (right arm, sitting);  P: 69 bpm (right arm (BP Cuff), sitting);  sCr: 1.31 mg/dL;  GFR: 58.62        Exams:     PHYSICAL EXAM:     GENERAL: Vitals recorded well developed, well nourished;     NECK: range of motion is decreased with rotation in either direction;  thyroid is non-palpable;     RESPIRATORY: normal respiratory rate and pattern with no distress; normal breath sounds with no rales, rhonchi, wheezes or rubs;     CARDIOVASCULAR: normal rate; rhythm is regular;  no systolic murmur;     GASTROINTESTINAL: nontender; normal bowel sounds; no masses;     SKIN:  no significant rashes or lesions; no suspicious moles;     PSYCHIATRIC: appropriate affect and demeanor;     Foot exam performed.      Left foot exam    Protective sensation using  Monofilament test: Slightly decreased, with estimated force of 0.2 grams applied.    Vascular status: normal peripheral vascular exam with palpable dorsal pedal and posterior tibal pulses and brisk digital capillary refill    Skin integrity: Callus on ball of foot    Right foot exam    Protective sensation using Monofilament test: Slightly decreased, with estimated force of 0.2 grams applied.    Vascular status: normal peripheral vascular exam with palpable dorsal pedal and posterior tibal pulses and brisk digital capillary refill    Skin is intact without sores or ulcers         ASSESSMENT           250.00   E11.9  Type 2 diabetes (mild)              DDx:     272.2   E78.2  Mixed hyperlipidemia              DDx:     427.31   I48.0  Atrial fibrillation              DDx:     530.81   K21.9  GERD              DDx:     600.00   N40.1  BPH              DDx:     723.1   M54.2  Neck pain              DDx:         ORDERS:         Radiology/Test Orders:       [id number]  Radiologic examination, spine, cervical; minimum of four views  (Send-Out)         3017F  Colorectal CA screen results documented and reviewed (PV)  (In-House)           Lab Orders:       [id number]  DIAB2 - [facility] CMP A1C LIPID AND MICRO ALBUM CR RATIO: [id number]  (Send-Out)  (CC DR. BORDEN - CARDIOLOGY        ==============================)       18034  TSH - H TSH  (Send-Out)         68421  CK - [facility]- CK total  (Send-Out)         99948  BDCB2 - [facility] CBC w/o diff  (Send-Out)           Other Orders:       2028F  Foot examination performed (includes examination through visual inspection, sensory exam with monofi  (In-House)           Calculated BMI above the upper parameter and a follow-up plan was documented in the medical record  (In-House)                   PLAN:          Type 2 diabetes (mild)     LABORATORY:  Labs ordered to be performed today include Diabetes Panel 2;CMP, A1C, Lipid, Microalbumin:Creatinine Ratio and TSH.       RECOMMENDATIONS given include: Today, we will assess Marcelino as noted below.  No changes are anticipated for the near term.  We will see what his X-rays and labs show and then be back in touch with him.  This neck is bothering him.  Meds to be filled as noted..  MIPS Vaccines Flu and Pneumonia updated in Shot record     COLORECTAL CANCER SCREENING: Results are in chart     BMI Elevated - Follow-Up Plan: He was provided education on weight loss strategies           Orders:       [id number]  DIAB2 - [facility] CMP A1C LIPID AND MICRO ALBUM CR RATIO: [id number]  (Send-Out)  (CC DR. BORDEN - CARDIOLOGY        ==============================)       60933  TSH - [facility] TSH  (Send-Out)         3017F  Colorectal CA screen results documented and reviewed (PV)  (In-House)           Calculated BMI above the upper parameter and a follow-up plan was documented in the medical record  (In-House)             Patient Education Handouts:       Non-Insulin Dependent Diabetes Mellitus (NIDDM)           Mixed hyperlipidemia     LABORATORY:  Labs ordered to be performed today include CK, total.            Orders:       [id number]  CK - [facility]- CK total  (Send-Out)            Atrial fibrillation     LABORATORY:  Labs ordered to be performed today include CBC W/O DIFF.            Orders:       [id number]  BDCB2 - [facility] CBC w/o diff  (Send-Out)            Neck pain         RADIOLOGY:  I have ordered a C-Spine x-ray series to be done today.            Orders:       [id number]  Radiologic examination, spine, cervical; minimum of four views  (Send-Out)               Other Orders:       2028F  Foot examination performed (includes examination through visual inspection, sensory exam with monofi  (In-House)           CHARGE CAPTURE           **Please note: ICD descriptions below are intended for billing purposes only and may not represent clinical diagnoses**        Primary Diagnosis:         250.00 Type 2 diabetes (mild)            E11.9    Type 2 diabetes  mellitus without complications              Orders:          [id number]   Office/outpatient visit; established patient, level 4  (In-House)             3017F   Colorectal CA screen results documented and reviewed (PV)  (In-House)                Calculated BMI above the upper parameter and a follow-up plan was documented in the medical record  (In-House)           272.2 Mixed hyperlipidemia            E78.2    Mixed hyperlipidemia    427.31 Atrial fibrillation            I48.0    Paroxysmal atrial fibrillation    530.81 GERD            K21.9    Gastro-esophageal reflux disease without esophagitis    600.00 BPH            N40.1    Enlarged prostate with lower urinary tract symptoms    723.1 Neck pain            M54.2    Cervicalgia        Other Orders:           2028F   Foot examination performed (includes examination through visual inspection, sensory exam with monofi  (In-House)

## 2021-07-01 VITALS
HEIGHT: 74 IN | WEIGHT: 244 LBS | TEMPERATURE: 97.8 F | HEART RATE: 65 BPM | DIASTOLIC BLOOD PRESSURE: 55 MMHG | BODY MASS INDEX: 31.32 KG/M2 | SYSTOLIC BLOOD PRESSURE: 113 MMHG

## 2021-07-01 VITALS
HEART RATE: 66 BPM | DIASTOLIC BLOOD PRESSURE: 45 MMHG | SYSTOLIC BLOOD PRESSURE: 112 MMHG | BODY MASS INDEX: 32.96 KG/M2 | HEIGHT: 74 IN | WEIGHT: 256.8 LBS | TEMPERATURE: 97.9 F

## 2021-07-01 VITALS
HEIGHT: 74 IN | HEART RATE: 75 BPM | BODY MASS INDEX: 32.11 KG/M2 | SYSTOLIC BLOOD PRESSURE: 109 MMHG | WEIGHT: 250.2 LBS | TEMPERATURE: 97.4 F | DIASTOLIC BLOOD PRESSURE: 58 MMHG

## 2021-07-01 VITALS
HEIGHT: 74 IN | DIASTOLIC BLOOD PRESSURE: 66 MMHG | BODY MASS INDEX: 31.34 KG/M2 | HEART RATE: 76 BPM | SYSTOLIC BLOOD PRESSURE: 111 MMHG | WEIGHT: 244.2 LBS | TEMPERATURE: 97.7 F

## 2021-07-01 VITALS
DIASTOLIC BLOOD PRESSURE: 68 MMHG | BODY MASS INDEX: 31.57 KG/M2 | HEART RATE: 68 BPM | HEIGHT: 74 IN | TEMPERATURE: 97.3 F | WEIGHT: 246 LBS | SYSTOLIC BLOOD PRESSURE: 110 MMHG

## 2021-07-01 VITALS
HEIGHT: 74 IN | BODY MASS INDEX: 31.32 KG/M2 | SYSTOLIC BLOOD PRESSURE: 94 MMHG | HEART RATE: 72 BPM | DIASTOLIC BLOOD PRESSURE: 64 MMHG | OXYGEN SATURATION: 95 % | TEMPERATURE: 97.6 F | WEIGHT: 244 LBS

## 2021-07-01 VITALS
WEIGHT: 245.8 LBS | HEIGHT: 74 IN | TEMPERATURE: 96.8 F | BODY MASS INDEX: 31.54 KG/M2 | SYSTOLIC BLOOD PRESSURE: 109 MMHG | HEART RATE: 67 BPM | DIASTOLIC BLOOD PRESSURE: 74 MMHG

## 2021-07-01 VITALS
BODY MASS INDEX: 31.21 KG/M2 | HEART RATE: 65 BPM | DIASTOLIC BLOOD PRESSURE: 59 MMHG | SYSTOLIC BLOOD PRESSURE: 123 MMHG | WEIGHT: 243.2 LBS | TEMPERATURE: 98.2 F | HEIGHT: 74 IN | OXYGEN SATURATION: 96 %

## 2021-07-01 VITALS
TEMPERATURE: 96.9 F | BODY MASS INDEX: 31.32 KG/M2 | HEART RATE: 79 BPM | SYSTOLIC BLOOD PRESSURE: 112 MMHG | DIASTOLIC BLOOD PRESSURE: 69 MMHG | HEIGHT: 74 IN | WEIGHT: 244 LBS

## 2021-07-01 VITALS
DIASTOLIC BLOOD PRESSURE: 59 MMHG | HEART RATE: 69 BPM | BODY MASS INDEX: 32.08 KG/M2 | TEMPERATURE: 97.9 F | WEIGHT: 250 LBS | SYSTOLIC BLOOD PRESSURE: 109 MMHG | HEIGHT: 74 IN

## 2021-07-01 VITALS
BODY MASS INDEX: 31.06 KG/M2 | WEIGHT: 242 LBS | HEIGHT: 74 IN | DIASTOLIC BLOOD PRESSURE: 64 MMHG | HEART RATE: 70 BPM | TEMPERATURE: 97.1 F | SYSTOLIC BLOOD PRESSURE: 117 MMHG